# Patient Record
Sex: MALE | Race: WHITE | URBAN - METROPOLITAN AREA
[De-identification: names, ages, dates, MRNs, and addresses within clinical notes are randomized per-mention and may not be internally consistent; named-entity substitution may affect disease eponyms.]

---

## 2024-01-01 ENCOUNTER — NURSING HOME VISIT (OUTPATIENT)
Dept: GERIATRICS | Facility: CLINIC | Age: 79
End: 2024-01-01
Payer: COMMERCIAL

## 2024-01-01 ENCOUNTER — TRANSITIONAL CARE UNIT VISIT (OUTPATIENT)
Dept: GERIATRICS | Facility: CLINIC | Age: 79
End: 2024-01-01
Payer: COMMERCIAL

## 2024-01-01 ENCOUNTER — LAB REQUISITION (OUTPATIENT)
Dept: LAB | Facility: CLINIC | Age: 79
End: 2024-01-01
Payer: COMMERCIAL

## 2024-01-01 ENCOUNTER — DOCUMENTATION ONLY (OUTPATIENT)
Dept: GERIATRICS | Facility: CLINIC | Age: 79
End: 2024-01-01
Payer: COMMERCIAL

## 2024-01-01 ENCOUNTER — TELEPHONE (OUTPATIENT)
Dept: GERIATRICS | Facility: CLINIC | Age: 79
End: 2024-01-01

## 2024-01-01 ENCOUNTER — DOCUMENTATION ONLY (OUTPATIENT)
Dept: OTHER | Facility: CLINIC | Age: 79
End: 2024-01-01
Payer: COMMERCIAL

## 2024-01-01 ENCOUNTER — TELEPHONE (OUTPATIENT)
Dept: GERIATRICS | Facility: CLINIC | Age: 79
End: 2024-01-01
Payer: COMMERCIAL

## 2024-01-01 ENCOUNTER — TRANSFERRED RECORDS (OUTPATIENT)
Dept: HEALTH INFORMATION MANAGEMENT | Facility: CLINIC | Age: 79
End: 2024-01-01
Payer: COMMERCIAL

## 2024-01-01 ENCOUNTER — DOCUMENTATION ONLY (OUTPATIENT)
Dept: GERIATRICS | Facility: CLINIC | Age: 79
End: 2024-01-01

## 2024-01-01 ENCOUNTER — HEALTH MAINTENANCE LETTER (OUTPATIENT)
Age: 79
End: 2024-01-01

## 2024-01-01 ENCOUNTER — DISCHARGE SUMMARY NURSING HOME (OUTPATIENT)
Dept: GERIATRICS | Facility: CLINIC | Age: 79
End: 2024-01-01
Payer: COMMERCIAL

## 2024-01-01 VITALS
HEART RATE: 88 BPM | DIASTOLIC BLOOD PRESSURE: 75 MMHG | OXYGEN SATURATION: 97 % | SYSTOLIC BLOOD PRESSURE: 176 MMHG | WEIGHT: 201 LBS | TEMPERATURE: 98.2 F | BODY MASS INDEX: 28.84 KG/M2 | RESPIRATION RATE: 18 BRPM

## 2024-01-01 VITALS
HEART RATE: 82 BPM | RESPIRATION RATE: 20 BRPM | WEIGHT: 205 LBS | DIASTOLIC BLOOD PRESSURE: 55 MMHG | OXYGEN SATURATION: 95 % | SYSTOLIC BLOOD PRESSURE: 146 MMHG | TEMPERATURE: 98.6 F | BODY MASS INDEX: 29.41 KG/M2

## 2024-01-01 VITALS
SYSTOLIC BLOOD PRESSURE: 167 MMHG | RESPIRATION RATE: 20 BRPM | WEIGHT: 186.3 LBS | TEMPERATURE: 98 F | BODY MASS INDEX: 26.67 KG/M2 | DIASTOLIC BLOOD PRESSURE: 80 MMHG | OXYGEN SATURATION: 95 % | HEIGHT: 70 IN | HEART RATE: 73 BPM

## 2024-01-01 VITALS
SYSTOLIC BLOOD PRESSURE: 176 MMHG | RESPIRATION RATE: 18 BRPM | HEART RATE: 88 BPM | TEMPERATURE: 98.6 F | BODY MASS INDEX: 28.84 KG/M2 | OXYGEN SATURATION: 97 % | DIASTOLIC BLOOD PRESSURE: 68 MMHG | WEIGHT: 201 LBS

## 2024-01-01 VITALS
OXYGEN SATURATION: 93 % | WEIGHT: 185 LBS | SYSTOLIC BLOOD PRESSURE: 167 MMHG | TEMPERATURE: 97.5 F | HEIGHT: 70 IN | BODY MASS INDEX: 26.48 KG/M2 | RESPIRATION RATE: 18 BRPM | HEART RATE: 71 BPM | DIASTOLIC BLOOD PRESSURE: 77 MMHG

## 2024-01-01 VITALS
HEIGHT: 70 IN | HEART RATE: 60 BPM | SYSTOLIC BLOOD PRESSURE: 173 MMHG | DIASTOLIC BLOOD PRESSURE: 70 MMHG | RESPIRATION RATE: 18 BRPM | WEIGHT: 186 LBS | TEMPERATURE: 98 F | OXYGEN SATURATION: 96 % | BODY MASS INDEX: 26.63 KG/M2

## 2024-01-01 VITALS
OXYGEN SATURATION: 98 % | HEART RATE: 78 BPM | BODY MASS INDEX: 28.77 KG/M2 | SYSTOLIC BLOOD PRESSURE: 145 MMHG | DIASTOLIC BLOOD PRESSURE: 82 MMHG | WEIGHT: 201 LBS | HEIGHT: 70 IN | RESPIRATION RATE: 18 BRPM | TEMPERATURE: 98.2 F

## 2024-01-01 VITALS
HEART RATE: 73 BPM | TEMPERATURE: 98.2 F | RESPIRATION RATE: 18 BRPM | HEIGHT: 70 IN | DIASTOLIC BLOOD PRESSURE: 70 MMHG | BODY MASS INDEX: 26.58 KG/M2 | SYSTOLIC BLOOD PRESSURE: 168 MMHG | OXYGEN SATURATION: 93 % | WEIGHT: 185.7 LBS

## 2024-01-01 VITALS
WEIGHT: 186 LBS | TEMPERATURE: 97.5 F | HEART RATE: 72 BPM | DIASTOLIC BLOOD PRESSURE: 75 MMHG | BODY MASS INDEX: 26.63 KG/M2 | OXYGEN SATURATION: 95 % | RESPIRATION RATE: 18 BRPM | HEIGHT: 70 IN | SYSTOLIC BLOOD PRESSURE: 152 MMHG

## 2024-01-01 VITALS
HEIGHT: 70 IN | HEART RATE: 60 BPM | DIASTOLIC BLOOD PRESSURE: 76 MMHG | SYSTOLIC BLOOD PRESSURE: 183 MMHG | RESPIRATION RATE: 18 BRPM | TEMPERATURE: 98 F | BODY MASS INDEX: 26.63 KG/M2 | WEIGHT: 186 LBS | OXYGEN SATURATION: 96 %

## 2024-01-01 VITALS
TEMPERATURE: 98.6 F | WEIGHT: 186 LBS | RESPIRATION RATE: 19 BRPM | HEIGHT: 70 IN | SYSTOLIC BLOOD PRESSURE: 165 MMHG | HEART RATE: 76 BPM | OXYGEN SATURATION: 96 % | DIASTOLIC BLOOD PRESSURE: 69 MMHG | BODY MASS INDEX: 26.63 KG/M2

## 2024-01-01 VITALS
OXYGEN SATURATION: 92 % | TEMPERATURE: 98 F | WEIGHT: 197.2 LBS | HEART RATE: 84 BPM | RESPIRATION RATE: 18 BRPM | DIASTOLIC BLOOD PRESSURE: 78 MMHG | DIASTOLIC BLOOD PRESSURE: 83 MMHG | OXYGEN SATURATION: 93 % | SYSTOLIC BLOOD PRESSURE: 158 MMHG | RESPIRATION RATE: 18 BRPM | TEMPERATURE: 98.4 F | HEART RATE: 86 BPM | SYSTOLIC BLOOD PRESSURE: 162 MMHG | WEIGHT: 195.8 LBS

## 2024-01-01 VITALS
TEMPERATURE: 97.7 F | HEART RATE: 53 BPM | BODY MASS INDEX: 40.41 KG/M2 | RESPIRATION RATE: 18 BRPM | WEIGHT: 281.6 LBS | OXYGEN SATURATION: 92 % | DIASTOLIC BLOOD PRESSURE: 72 MMHG | SYSTOLIC BLOOD PRESSURE: 168 MMHG

## 2024-01-01 VITALS
OXYGEN SATURATION: 95 % | SYSTOLIC BLOOD PRESSURE: 175 MMHG | HEART RATE: 86 BPM | DIASTOLIC BLOOD PRESSURE: 77 MMHG | RESPIRATION RATE: 18 BRPM | WEIGHT: 199.8 LBS | TEMPERATURE: 98 F

## 2024-01-01 VITALS
OXYGEN SATURATION: 70 % | SYSTOLIC BLOOD PRESSURE: 181 MMHG | RESPIRATION RATE: 18 BRPM | DIASTOLIC BLOOD PRESSURE: 74 MMHG | TEMPERATURE: 97.5 F | HEART RATE: 69 BPM

## 2024-01-01 VITALS
HEART RATE: 88 BPM | TEMPERATURE: 98.2 F | DIASTOLIC BLOOD PRESSURE: 74 MMHG | BODY MASS INDEX: 28.84 KG/M2 | SYSTOLIC BLOOD PRESSURE: 158 MMHG | OXYGEN SATURATION: 97 % | WEIGHT: 201 LBS | RESPIRATION RATE: 18 BRPM

## 2024-01-01 DIAGNOSIS — I73.9 PAD (PERIPHERAL ARTERY DISEASE) (H): ICD-10-CM

## 2024-01-01 DIAGNOSIS — E11.22 TYPE 2 DM WITH CKD STAGE 4 AND HYPERTENSION (H): ICD-10-CM

## 2024-01-01 DIAGNOSIS — D64.9 ANEMIA, UNSPECIFIED TYPE: ICD-10-CM

## 2024-01-01 DIAGNOSIS — I12.9 TYPE 2 DM WITH CKD STAGE 4 AND HYPERTENSION (H): ICD-10-CM

## 2024-01-01 DIAGNOSIS — Z79.01 LONG TERM CURRENT USE OF ANTICOAGULANT THERAPY: ICD-10-CM

## 2024-01-01 DIAGNOSIS — I48.20 CHRONIC ATRIAL FIBRILLATION (H): ICD-10-CM

## 2024-01-01 DIAGNOSIS — N17.9 ACUTE KIDNEY FAILURE, UNSPECIFIED (H): ICD-10-CM

## 2024-01-01 DIAGNOSIS — R79.1 ABNORMAL COAGULATION PROFILE: ICD-10-CM

## 2024-01-01 DIAGNOSIS — E11.42 DIABETIC PERIPHERAL NEUROPATHY ASSOCIATED WITH TYPE 2 DIABETES MELLITUS (H): ICD-10-CM

## 2024-01-01 DIAGNOSIS — R06.03 RESPIRATORY DISTRESS: Primary | ICD-10-CM

## 2024-01-01 DIAGNOSIS — D64.9 ANEMIA, UNSPECIFIED: ICD-10-CM

## 2024-01-01 DIAGNOSIS — N18.4 TYPE 2 DM WITH CKD STAGE 4 AND HYPERTENSION (H): ICD-10-CM

## 2024-01-01 DIAGNOSIS — E55.9 VITAMIN D DEFICIENCY, UNSPECIFIED: ICD-10-CM

## 2024-01-01 DIAGNOSIS — E11.22 TYPE 2 DIABETES MELLITUS WITH STAGE 3A CHRONIC KIDNEY DISEASE, WITHOUT LONG-TERM CURRENT USE OF INSULIN (H): ICD-10-CM

## 2024-01-01 DIAGNOSIS — S70.11XS HEMATOMA OF RIGHT THIGH, SEQUELA: Primary | ICD-10-CM

## 2024-01-01 DIAGNOSIS — N18.4 CKD (CHRONIC KIDNEY DISEASE) STAGE 4, GFR 15-29 ML/MIN (H): Primary | ICD-10-CM

## 2024-01-01 DIAGNOSIS — F03.B3 MODERATE DEMENTIA WITH MOOD DISTURBANCE, UNSPECIFIED DEMENTIA TYPE (H): ICD-10-CM

## 2024-01-01 DIAGNOSIS — N18.31 TYPE 2 DIABETES MELLITUS WITH STAGE 3A CHRONIC KIDNEY DISEASE, WITHOUT LONG-TERM CURRENT USE OF INSULIN (H): ICD-10-CM

## 2024-01-01 DIAGNOSIS — R53.81 PHYSICAL DECONDITIONING: ICD-10-CM

## 2024-01-01 DIAGNOSIS — I13.10 HYPERTENSIVE HEART AND RENAL DISEASE WITH RENAL FAILURE, STAGE 1 THROUGH STAGE 4 OR UNSPECIFIED CHRONIC KIDNEY DISEASE, WITHOUT HEART FAILURE: ICD-10-CM

## 2024-01-01 DIAGNOSIS — I48.20 CHRONIC ATRIAL FIBRILLATION, UNSPECIFIED (H): ICD-10-CM

## 2024-01-01 DIAGNOSIS — S32.599S CLOSED FRACTURE OF PUBIC RAMUS, UNSPECIFIED LATERALITY, SEQUELA: ICD-10-CM

## 2024-01-01 DIAGNOSIS — N18.4 ACUTE RENAL FAILURE SUPERIMPOSED ON STAGE 4 CHRONIC KIDNEY DISEASE, UNSPECIFIED ACUTE RENAL FAILURE TYPE (H): Primary | ICD-10-CM

## 2024-01-01 DIAGNOSIS — F33.1 MODERATE EPISODE OF RECURRENT MAJOR DEPRESSIVE DISORDER (H): ICD-10-CM

## 2024-01-01 DIAGNOSIS — E11.9 TYPE 2 DIABETES MELLITUS WITHOUT COMPLICATIONS (H): ICD-10-CM

## 2024-01-01 DIAGNOSIS — N39.0 RECURRENT URINARY TRACT INFECTION: ICD-10-CM

## 2024-01-01 DIAGNOSIS — N39.0 RECURRENT UTI: Primary | ICD-10-CM

## 2024-01-01 DIAGNOSIS — D62 ANEMIA DUE TO BLOOD LOSS, ACUTE: ICD-10-CM

## 2024-01-01 DIAGNOSIS — Z11.1 ENCOUNTER FOR SCREENING FOR RESPIRATORY TUBERCULOSIS: ICD-10-CM

## 2024-01-01 DIAGNOSIS — N39.0 URINARY TRACT INFECTION, SITE NOT SPECIFIED: ICD-10-CM

## 2024-01-01 DIAGNOSIS — I10 ESSENTIAL (PRIMARY) HYPERTENSION: ICD-10-CM

## 2024-01-01 DIAGNOSIS — R30.0 DYSURIA: ICD-10-CM

## 2024-01-01 DIAGNOSIS — N18.9 CHRONIC KIDNEY DISEASE, UNSPECIFIED: ICD-10-CM

## 2024-01-01 DIAGNOSIS — I73.9 PAD (PERIPHERAL ARTERY DISEASE) (H): Primary | ICD-10-CM

## 2024-01-01 DIAGNOSIS — Z51.81 ENCOUNTER FOR THERAPEUTIC DRUG MONITORING: ICD-10-CM

## 2024-01-01 DIAGNOSIS — I73.9 PERIPHERAL VASCULAR DISEASE, UNSPECIFIED (H): ICD-10-CM

## 2024-01-01 DIAGNOSIS — I48.20 CHRONIC ATRIAL FIBRILLATION (H): Primary | ICD-10-CM

## 2024-01-01 DIAGNOSIS — J18.9 PNEUMONIA OF BOTH LUNGS DUE TO INFECTIOUS ORGANISM, UNSPECIFIED PART OF LUNG: Primary | ICD-10-CM

## 2024-01-01 DIAGNOSIS — N17.9 ACUTE RENAL FAILURE SUPERIMPOSED ON STAGE 4 CHRONIC KIDNEY DISEASE, UNSPECIFIED ACUTE RENAL FAILURE TYPE (H): Primary | ICD-10-CM

## 2024-01-01 DIAGNOSIS — R40.0 SOMNOLENCE: ICD-10-CM

## 2024-01-01 DIAGNOSIS — I48.91 UNSPECIFIED ATRIAL FIBRILLATION (H): ICD-10-CM

## 2024-01-01 DIAGNOSIS — N39.0 RECURRENT UTI: ICD-10-CM

## 2024-01-01 DIAGNOSIS — N18.4 CKD (CHRONIC KIDNEY DISEASE) STAGE 4, GFR 15-29 ML/MIN (H): ICD-10-CM

## 2024-01-01 DIAGNOSIS — F03.918 DEMENTIA WITH BEHAVIORAL DISTURBANCE (H): ICD-10-CM

## 2024-01-01 LAB
ALBUMIN UR-MCNC: 200 MG/DL
ALBUMIN UR-MCNC: 300 MG/DL
ALBUMIN UR-MCNC: 300 MG/DL
ANION GAP SERPL CALCULATED.3IONS-SCNC: 6 MMOL/L (ref 7–15)
ANION GAP SERPL CALCULATED.3IONS-SCNC: 7 MMOL/L (ref 7–15)
ANION GAP SERPL CALCULATED.3IONS-SCNC: 8 MMOL/L (ref 7–15)
ANION GAP SERPL CALCULATED.3IONS-SCNC: 9 MMOL/L (ref 7–15)
APPEARANCE UR: ABNORMAL
APPEARANCE UR: CLEAR
APPEARANCE UR: CLEAR
BACTERIA #/AREA URNS HPF: ABNORMAL /HPF
BACTERIA #/AREA URNS HPF: ABNORMAL /HPF
BACTERIA UR CULT: NO GROWTH
BACTERIA UR CULT: NO GROWTH
BASOPHILS # BLD AUTO: 0 10E3/UL (ref 0–0.2)
BASOPHILS NFR BLD AUTO: 1 %
BILIRUB UR QL STRIP: NEGATIVE
BUN SERPL-MCNC: 38.3 MG/DL (ref 8–23)
BUN SERPL-MCNC: 40.3 MG/DL (ref 8–23)
BUN SERPL-MCNC: 41.6 MG/DL (ref 8–23)
BUN SERPL-MCNC: 46 MG/DL (ref 8–23)
BUN SERPL-MCNC: 47 MG/DL (ref 8–23)
BUN SERPL-MCNC: 50.3 MG/DL (ref 8–23)
CALCIUM SERPL-MCNC: 7.8 MG/DL (ref 8.8–10.4)
CALCIUM SERPL-MCNC: 8.1 MG/DL (ref 8.8–10.2)
CALCIUM SERPL-MCNC: 8.1 MG/DL (ref 8.8–10.4)
CALCIUM SERPL-MCNC: 8.2 MG/DL (ref 8.8–10.4)
CALCIUM SERPL-MCNC: 8.3 MG/DL (ref 8.8–10.2)
CALCIUM SERPL-MCNC: 8.3 MG/DL (ref 8.8–10.2)
CHLORIDE SERPL-SCNC: 110 MMOL/L (ref 98–107)
CHLORIDE SERPL-SCNC: 112 MMOL/L (ref 98–107)
CHLORIDE SERPL-SCNC: 112 MMOL/L (ref 98–107)
CHLORIDE SERPL-SCNC: 113 MMOL/L (ref 98–107)
CHLORIDE SERPL-SCNC: 116 MMOL/L (ref 98–107)
CHLORIDE SERPL-SCNC: 117 MMOL/L (ref 98–107)
COLOR UR AUTO: ABNORMAL
CREAT SERPL-MCNC: 2.15 MG/DL (ref 0.67–1.17)
CREAT SERPL-MCNC: 2.15 MG/DL (ref 0.67–1.17)
CREAT SERPL-MCNC: 2.17 MG/DL (ref 0.67–1.17)
CREAT SERPL-MCNC: 2.26 MG/DL (ref 0.67–1.17)
CREAT SERPL-MCNC: 2.32 MG/DL (ref 0.67–1.17)
CREAT SERPL-MCNC: 2.39 MG/DL (ref 0.67–1.17)
DEPRECATED HCO3 PLAS-SCNC: 22 MMOL/L (ref 22–29)
DEPRECATED HCO3 PLAS-SCNC: 25 MMOL/L (ref 22–29)
DEPRECATED HCO3 PLAS-SCNC: 25 MMOL/L (ref 22–29)
EGFRCR SERPLBLD CKD-EPI 2021: 27 ML/MIN/1.73M2
EGFRCR SERPLBLD CKD-EPI 2021: 28 ML/MIN/1.73M2
EGFRCR SERPLBLD CKD-EPI 2021: 29 ML/MIN/1.73M2
EGFRCR SERPLBLD CKD-EPI 2021: 31 ML/MIN/1.73M2
EOSINOPHIL # BLD AUTO: 0.2 10E3/UL (ref 0–0.7)
EOSINOPHIL NFR BLD AUTO: 2 %
ERYTHROCYTE [DISTWIDTH] IN BLOOD BY AUTOMATED COUNT: 14.3 % (ref 10–15)
ERYTHROCYTE [DISTWIDTH] IN BLOOD BY AUTOMATED COUNT: 14.4 % (ref 10–15)
ERYTHROCYTE [DISTWIDTH] IN BLOOD BY AUTOMATED COUNT: 14.6 % (ref 10–15)
GAMMA INTERFERON BACKGROUND BLD IA-ACNC: 0.03 IU/ML
GAMMA INTERFERON BACKGROUND BLD IA-ACNC: 0.03 IU/ML
GLUCOSE SERPL-MCNC: 103 MG/DL (ref 70–99)
GLUCOSE SERPL-MCNC: 106 MG/DL (ref 70–99)
GLUCOSE SERPL-MCNC: 108 MG/DL (ref 70–99)
GLUCOSE SERPL-MCNC: 117 MG/DL (ref 70–99)
GLUCOSE SERPL-MCNC: 117 MG/DL (ref 70–99)
GLUCOSE SERPL-MCNC: 123 MG/DL (ref 70–99)
GLUCOSE UR STRIP-MCNC: 100 MG/DL
GLUCOSE UR STRIP-MCNC: 200 MG/DL
GLUCOSE UR STRIP-MCNC: >=1000 MG/DL
HBA1C MFR BLD: 6.5 %
HCO3 SERPL-SCNC: 23 MMOL/L (ref 22–29)
HCO3 SERPL-SCNC: 23 MMOL/L (ref 22–29)
HCO3 SERPL-SCNC: 26 MMOL/L (ref 22–29)
HCT VFR BLD AUTO: 26.1 % (ref 40–53)
HCT VFR BLD AUTO: 26.2 % (ref 40–53)
HCT VFR BLD AUTO: 30.2 % (ref 40–53)
HGB BLD-MCNC: 8 G/DL (ref 13.3–17.7)
HGB BLD-MCNC: 8.2 G/DL (ref 13.3–17.7)
HGB BLD-MCNC: 8.9 G/DL (ref 13.3–17.7)
HGB BLD-MCNC: 9.7 G/DL (ref 13.3–17.7)
HGB UR QL STRIP: ABNORMAL
IMM GRANULOCYTES # BLD: 0 10E3/UL
IMM GRANULOCYTES NFR BLD: 0 %
INR PPP: 1.06 (ref 0.85–1.15)
INR PPP: 1.72 (ref 0.85–1.15)
INR PPP: 2.24 (ref 0.85–1.15)
INR PPP: 2.78 (ref 0.85–1.15)
INR PPP: 4.54 (ref 0.85–1.15)
IRON BINDING CAPACITY (ROCHE): 163 UG/DL (ref 240–430)
IRON SATN MFR SERPL: 26 % (ref 15–46)
IRON SERPL-MCNC: 43 UG/DL (ref 61–157)
KETONES UR STRIP-MCNC: NEGATIVE MG/DL
LEUKOCYTE ESTERASE UR QL STRIP: ABNORMAL
LEUKOCYTE ESTERASE UR QL STRIP: ABNORMAL
LEUKOCYTE ESTERASE UR QL STRIP: NEGATIVE
LYMPHOCYTES # BLD AUTO: 2.1 10E3/UL (ref 0.8–5.3)
LYMPHOCYTES NFR BLD AUTO: 26 %
M TB IFN-G BLD-IMP: NEGATIVE
M TB IFN-G BLD-IMP: NEGATIVE
M TB IFN-G CD4+ BCKGRND COR BLD-ACNC: 1.63 IU/ML
M TB IFN-G CD4+ BCKGRND COR BLD-ACNC: 3.38 IU/ML
MCH RBC QN AUTO: 28 PG (ref 26.5–33)
MCH RBC QN AUTO: 28.8 PG (ref 26.5–33)
MCH RBC QN AUTO: 29.1 PG (ref 26.5–33)
MCHC RBC AUTO-ENTMCNC: 30.5 G/DL (ref 31.5–36.5)
MCHC RBC AUTO-ENTMCNC: 31.4 G/DL (ref 31.5–36.5)
MCHC RBC AUTO-ENTMCNC: 32.1 G/DL (ref 31.5–36.5)
MCV RBC AUTO: 91 FL (ref 78–100)
MCV RBC AUTO: 92 FL (ref 78–100)
MCV RBC AUTO: 92 FL (ref 78–100)
MITOGEN IGNF BCKGRD COR BLD-ACNC: 0 IU/ML
MONOCYTES # BLD AUTO: 0.5 10E3/UL (ref 0–1.3)
MONOCYTES NFR BLD AUTO: 6 %
MUCOUS THREADS #/AREA URNS LPF: PRESENT /LPF
NEUTROPHILS # BLD AUTO: 5.3 10E3/UL (ref 1.6–8.3)
NEUTROPHILS NFR BLD AUTO: 65 %
NITRATE UR QL: NEGATIVE
NRBC # BLD AUTO: 0 10E3/UL
NRBC BLD AUTO-RTO: 0 /100
PH UR STRIP: 5.5 [PH] (ref 5–7)
PH UR STRIP: 6 [PH] (ref 5–7)
PH UR STRIP: 7 [PH] (ref 5–7)
PLATELET # BLD AUTO: 168 10E3/UL (ref 150–450)
PLATELET # BLD AUTO: 183 10E3/UL (ref 150–450)
PLATELET # BLD AUTO: 205 10E3/UL (ref 150–450)
POTASSIUM SERPL-SCNC: 4 MMOL/L (ref 3.4–5.3)
POTASSIUM SERPL-SCNC: 4 MMOL/L (ref 3.4–5.3)
POTASSIUM SERPL-SCNC: 4.1 MMOL/L (ref 3.4–5.3)
POTASSIUM SERPL-SCNC: 4.2 MMOL/L (ref 3.4–5.3)
POTASSIUM SERPL-SCNC: 4.3 MMOL/L (ref 3.4–5.3)
POTASSIUM SERPL-SCNC: 4.4 MMOL/L (ref 3.4–5.3)
QUANTIFERON MITOGEN: 1.66 IU/ML
QUANTIFERON MITOGEN: 3.41 IU/ML
QUANTIFERON NIL TUBE: 0.03 IU/ML
QUANTIFERON NIL TUBE: 0.03 IU/ML
QUANTIFERON TB1 TUBE: 0.03 IU/ML
QUANTIFERON TB1 TUBE: 0.03 IU/ML
QUANTIFERON TB2 TUBE: 0.03
QUANTIFERON TB2 TUBE: 0.03
RBC # BLD AUTO: 2.85 10E6/UL (ref 4.4–5.9)
RBC # BLD AUTO: 2.86 10E6/UL (ref 4.4–5.9)
RBC # BLD AUTO: 3.33 10E6/UL (ref 4.4–5.9)
RBC URINE: 3 /HPF
RBC URINE: 3 /HPF
RBC URINE: 4 /HPF
SODIUM SERPL-SCNC: 142 MMOL/L (ref 135–145)
SODIUM SERPL-SCNC: 143 MMOL/L (ref 135–145)
SODIUM SERPL-SCNC: 144 MMOL/L (ref 135–145)
SODIUM SERPL-SCNC: 145 MMOL/L (ref 135–145)
SODIUM SERPL-SCNC: 147 MMOL/L (ref 135–145)
SODIUM SERPL-SCNC: 147 MMOL/L (ref 135–145)
SP GR UR STRIP: 1.01 (ref 1–1.03)
SP GR UR STRIP: 1.01 (ref 1–1.03)
SP GR UR STRIP: 1.02 (ref 1–1.03)
SQUAMOUS EPITHELIAL: <1 /HPF
UROBILINOGEN UR STRIP-MCNC: NORMAL MG/DL
WBC # BLD AUTO: 6.9 10E3/UL (ref 4–11)
WBC # BLD AUTO: 8.1 10E3/UL (ref 4–11)
WBC # BLD AUTO: 9.1 10E3/UL (ref 4–11)
WBC CLUMPS #/AREA URNS HPF: PRESENT /HPF
WBC CLUMPS #/AREA URNS HPF: PRESENT /HPF
WBC URINE: 54 /HPF
WBC URINE: 91 /HPF
WBC URINE: <1 /HPF

## 2024-01-01 PROCEDURE — P9604 ONE-WAY ALLOW PRORATED TRIP: HCPCS | Mod: ORL | Performed by: NURSE PRACTITIONER

## 2024-01-01 PROCEDURE — 80048 BASIC METABOLIC PNL TOTAL CA: CPT | Mod: ORL | Performed by: NURSE PRACTITIONER

## 2024-01-01 PROCEDURE — P9604 ONE-WAY ALLOW PRORATED TRIP: HCPCS | Performed by: INTERNAL MEDICINE

## 2024-01-01 PROCEDURE — 99316 NF DSCHRG MGMT 30 MIN+: CPT | Performed by: NURSE PRACTITIONER

## 2024-01-01 PROCEDURE — 99308 SBSQ NF CARE LOW MDM 20: CPT | Performed by: NURSE PRACTITIONER

## 2024-01-01 PROCEDURE — 87086 URINE CULTURE/COLONY COUNT: CPT | Mod: ORL | Performed by: INTERNAL MEDICINE

## 2024-01-01 PROCEDURE — 85027 COMPLETE CBC AUTOMATED: CPT | Mod: ORL | Performed by: NURSE PRACTITIONER

## 2024-01-01 PROCEDURE — 99310 SBSQ NF CARE HIGH MDM 45: CPT | Performed by: NURSE PRACTITIONER

## 2024-01-01 PROCEDURE — 86481 TB AG RESPONSE T-CELL SUSP: CPT | Performed by: INTERNAL MEDICINE

## 2024-01-01 PROCEDURE — 36415 COLL VENOUS BLD VENIPUNCTURE: CPT | Mod: ORL | Performed by: NURSE PRACTITIONER

## 2024-01-01 PROCEDURE — 36415 COLL VENOUS BLD VENIPUNCTURE: CPT | Performed by: INTERNAL MEDICINE

## 2024-01-01 PROCEDURE — 81001 URINALYSIS AUTO W/SCOPE: CPT | Mod: ORL | Performed by: NURSE PRACTITIONER

## 2024-01-01 PROCEDURE — 81001 URINALYSIS AUTO W/SCOPE: CPT | Mod: ORL | Performed by: INTERNAL MEDICINE

## 2024-01-01 PROCEDURE — 85610 PROTHROMBIN TIME: CPT | Mod: ORL | Performed by: INTERNAL MEDICINE

## 2024-01-01 PROCEDURE — 99305 1ST NF CARE MODERATE MDM 35: CPT | Performed by: INTERNAL MEDICINE

## 2024-01-01 PROCEDURE — 85610 PROTHROMBIN TIME: CPT | Performed by: INTERNAL MEDICINE

## 2024-01-01 PROCEDURE — 83036 HEMOGLOBIN GLYCOSYLATED A1C: CPT | Mod: ORL | Performed by: NURSE PRACTITIONER

## 2024-01-01 PROCEDURE — 85025 COMPLETE CBC W/AUTO DIFF WBC: CPT | Mod: ORL | Performed by: NURSE PRACTITIONER

## 2024-01-01 PROCEDURE — 99309 SBSQ NF CARE MODERATE MDM 30: CPT | Performed by: NURSE PRACTITIONER

## 2024-01-01 PROCEDURE — 99309 SBSQ NF CARE MODERATE MDM 30: CPT | Performed by: INTERNAL MEDICINE

## 2024-01-01 PROCEDURE — P9604 ONE-WAY ALLOW PRORATED TRIP: HCPCS | Mod: ORL | Performed by: INTERNAL MEDICINE

## 2024-01-01 PROCEDURE — 83550 IRON BINDING TEST: CPT | Mod: ORL | Performed by: NURSE PRACTITIONER

## 2024-01-01 PROCEDURE — 85610 PROTHROMBIN TIME: CPT | Mod: ORL | Performed by: NURSE PRACTITIONER

## 2024-01-01 PROCEDURE — 87086 URINE CULTURE/COLONY COUNT: CPT | Mod: ORL | Performed by: NURSE PRACTITIONER

## 2024-01-01 PROCEDURE — 85018 HEMOGLOBIN: CPT | Mod: ORL | Performed by: NURSE PRACTITIONER

## 2024-01-01 PROCEDURE — 36415 COLL VENOUS BLD VENIPUNCTURE: CPT | Mod: ORL | Performed by: INTERNAL MEDICINE

## 2024-01-01 RX ORDER — HYDRALAZINE HYDROCHLORIDE 10 MG/1
25 TABLET, FILM COATED ORAL 3 TIMES DAILY
COMMUNITY
Start: 2024-01-01

## 2024-01-01 RX ORDER — GABAPENTIN 300 MG/1
300 TABLET, FILM COATED ORAL
COMMUNITY
End: 2024-01-01

## 2024-01-01 RX ORDER — ACETAMINOPHEN 325 MG/1
650 TABLET ORAL 3 TIMES DAILY
COMMUNITY

## 2024-01-01 RX ORDER — QUETIAPINE FUMARATE 25 MG/1
12.5 TABLET, FILM COATED ORAL DAILY
COMMUNITY

## 2024-01-01 RX ORDER — GABAPENTIN 300 MG/1
300 CAPSULE ORAL AT BEDTIME
COMMUNITY
Start: 2024-01-01

## 2024-01-01 RX ORDER — SODIUM BICARBONATE 650 MG/1
650 TABLET ORAL 2 TIMES DAILY
COMMUNITY

## 2024-01-01 RX ORDER — POLYETHYLENE GLYCOL 3350 17 G/17G
17 POWDER, FOR SOLUTION ORAL DAILY
COMMUNITY

## 2024-01-01 RX ORDER — ATORVASTATIN CALCIUM 40 MG/1
40 TABLET, FILM COATED ORAL DAILY
COMMUNITY

## 2024-01-01 RX ORDER — QUETIAPINE FUMARATE 25 MG/1
6.25 TABLET, FILM COATED ORAL DAILY
COMMUNITY

## 2024-01-01 RX ORDER — AMLODIPINE BESYLATE 10 MG/1
10 TABLET ORAL DAILY
COMMUNITY

## 2024-01-01 RX ORDER — CITALOPRAM HYDROBROMIDE 10 MG/1
10 TABLET ORAL DAILY
COMMUNITY
Start: 2024-01-01

## 2024-01-01 RX ORDER — TAMSULOSIN HYDROCHLORIDE 0.4 MG/1
0.4 CAPSULE ORAL DAILY
COMMUNITY

## 2024-01-01 RX ORDER — METOPROLOL TARTRATE 25 MG/1
100 TABLET, FILM COATED ORAL 2 TIMES DAILY
COMMUNITY

## 2024-01-01 RX ORDER — SODIUM BICARBONATE 650 MG/1
650 TABLET ORAL 2 TIMES DAILY
COMMUNITY
End: 2024-01-01

## 2024-01-01 RX ORDER — AMITRIPTYLINE HYDROCHLORIDE 100 MG/1
100 TABLET ORAL AT BEDTIME
COMMUNITY
End: 2024-01-01

## 2024-01-01 RX ORDER — WARFARIN SODIUM 5 MG/1
TABLET ORAL DAILY
COMMUNITY

## 2024-01-01 RX ORDER — ACETAMINOPHEN 500 MG
1000 TABLET ORAL 3 TIMES DAILY
COMMUNITY
End: 2024-01-01

## 2024-01-01 RX ORDER — SENNOSIDES 8.6 MG
1 TABLET ORAL 2 TIMES DAILY
COMMUNITY

## 2024-01-01 RX ORDER — LIDOCAINE 4 G/G
1 PATCH TOPICAL EVERY 24 HOURS
COMMUNITY

## 2024-01-01 RX ORDER — LISINOPRIL 20 MG/1
20 TABLET ORAL DAILY
COMMUNITY
End: 2024-01-01

## 2024-01-01 RX ORDER — LIDOCAINE 4 G/G
1 PATCH TOPICAL EVERY 24 HOURS
COMMUNITY
End: 2024-01-01

## 2024-01-01 RX ORDER — TAMSULOSIN HYDROCHLORIDE 0.4 MG/1
0.8 CAPSULE ORAL DAILY
COMMUNITY
End: 2024-01-01

## 2024-01-16 PROBLEM — E11.42 DIABETIC PERIPHERAL NEUROPATHY ASSOCIATED WITH TYPE 2 DIABETES MELLITUS (H): Status: ACTIVE | Noted: 2021-07-05

## 2024-01-16 PROBLEM — N25.81 SECONDARY HYPERPARATHYROIDISM OF RENAL ORIGIN (H): Status: ACTIVE | Noted: 2023-05-22

## 2024-01-16 PROBLEM — E11.22 TYPE 2 DIABETES MELLITUS WITH STAGE 3A CHRONIC KIDNEY DISEASE, WITHOUT LONG-TERM CURRENT USE OF INSULIN (H): Status: ACTIVE | Noted: 2020-08-15

## 2024-01-16 PROBLEM — R80.9 NEPHROTIC RANGE PROTEINURIA: Status: ACTIVE | Noted: 2020-09-04

## 2024-01-16 PROBLEM — M72.0 DUPUYTREN'S CONTRACTURE OF RIGHT HAND: Status: ACTIVE | Noted: 2022-10-04

## 2024-01-16 PROBLEM — S32.512A: Status: ACTIVE | Noted: 2024-01-01

## 2024-01-16 PROBLEM — I73.9 PAD (PERIPHERAL ARTERY DISEASE) (H): Status: ACTIVE | Noted: 2020-09-10

## 2024-01-16 PROBLEM — N28.9 RENAL LESION: Status: ACTIVE | Noted: 2020-10-02

## 2024-01-16 PROBLEM — G31.84 MILD COGNITIVE IMPAIRMENT: Status: ACTIVE | Noted: 2022-08-12

## 2024-01-16 PROBLEM — N18.31 STAGE 3A CHRONIC KIDNEY DISEASE (H): Status: ACTIVE | Noted: 2022-08-29

## 2024-01-16 PROBLEM — T82.599A FAILING VASCULAR BYPASS GRAFT: Status: ACTIVE | Noted: 2020-09-10

## 2024-01-16 PROBLEM — R32 URINARY INCONTINENCE: Status: ACTIVE | Noted: 2022-10-04

## 2024-01-16 PROBLEM — N18.31 TYPE 2 DIABETES MELLITUS WITH STAGE 3A CHRONIC KIDNEY DISEASE, WITHOUT LONG-TERM CURRENT USE OF INSULIN (H): Status: ACTIVE | Noted: 2020-08-15

## 2024-01-16 PROBLEM — S70.11XA HEMATOMA OF RIGHT THIGH: Status: ACTIVE | Noted: 2024-01-01

## 2024-01-16 PROBLEM — Z86.59 HISTORY OF DEPRESSION: Status: ACTIVE | Noted: 2020-10-02

## 2024-01-16 PROBLEM — Z79.01 LONG TERM CURRENT USE OF ANTICOAGULANT THERAPY: Status: ACTIVE | Noted: 2020-10-02

## 2024-01-16 PROBLEM — H25.13 AGE-RELATED NUCLEAR CATARACT OF BOTH EYES: Status: ACTIVE | Noted: 2023-01-01

## 2024-01-16 PROBLEM — R29.6 FREQUENT FALLS: Status: ACTIVE | Noted: 2024-01-01

## 2024-01-16 PROBLEM — I70.209 ATHEROSCLEROSIS OF NATIVE ARTERY OF EXTREMITY (H): Status: ACTIVE | Noted: 2020-09-10

## 2024-01-16 PROBLEM — I10 ESSENTIAL HYPERTENSION: Status: ACTIVE | Noted: 2020-09-21

## 2024-01-16 NOTE — TELEPHONE ENCOUNTER
[facility] Geriatrics Triage Nurse INR     Provider: RAY Garrison CNP   Facility: [facility]   Facility Type:  TCU    Caller: Елена  Call Back Number: [phone]  Reason for call: INR  Diagnosis/Goal: PVD/PAD    Todays INR: 1.1(fingerstick)/1.06(lab)  Last INR 1/11 1.80(hospital)---Warfarin held and Vitamin K given due to hematoma)    Heparin/Lovenox:  No  Currently on ABX?: No  Other interacting medication:  None  Missed or refused doses: No    Verbal Order/Direction given by Provider: Warfarin 7.5mg on 1/16 and 5mg on 1/17.  Next INR(fingerstick) on 1/18/24.      Provider Giving Order:  RAY Garrison CNP     Verbal Order given to: Maggy Moon RN

## 2024-01-16 NOTE — LETTER
1/16/2024        RE: Cory Berg  [address] [address]        [facility] GERIATRICS    PRIMARY CARE PROVIDER AND CLINIC:  No primary care provider on file., No primary physician on file  Chief Complaint   Patient presents with     Hospital F/U      [place] Medical Record Number:  [id number]  Place of Service where encounter took place:  [facility] ([facility]) [[id number]]    HPI:   Cory Berg is a 78 y.o. male with PMH of type 2 DM, HTN, peripheral arterial disease s/p femoral bypass graft on warfarin, CKD stage IIIA, secondary hyperparathyroidism (renal origin), urinary incontinence, depression, and mild cognitive impairment, presented to the ED following a fall, found to have a R thigh hematoma. Hemoglobin 6.8 and he received 1 unit of packed red blood cells, also received vitamin K to reverse his INR.  Hemoglobin 8.5 by discharge.  Coumadin held throughout hospital stay and restarted at discharge. Hospital stay at [facility] 1/10/24-1/15/24.  He was admitted to [facility] for further nursing cares and rehab.    London was visited today while in his room initially eating lunch.  He states that his daughter was just visiting as well as his dog Kimberly who found one of his medications on the floor.  He believes that it was one of his heart medications and did not know that it had fallen.  He currently lives at home with his wife and he is eager to return back to home.  He does not feel that he needs to be here.  He feels very tired and would like to take a nap.  He is sleeping well at night.  Has ongoing tenderness to the right thigh and hip area but does not endorse a bruise there.  Does not have pain or discomfort anywhere else.  He has not felt short of breath, chest pain or cough.  Urinating without discomfort and having regular bowel movements.    CODE STATUS/ADVANCE DIRECTIVES DISCUSSION:  No Order  CPR/Full code   ALLERGIES: No Known Allergies   PAST MEDICAL HISTORY:  No past medical history on file.   PAST SURGICAL HISTORY:   has a past surgical history that includes IR Lower Extremity Angiogram Left (9/10/2020).  FAMILY HISTORY: family history is not on file.  SOCIAL HISTORY:     Patient's living condition: lives with spouse    Post Discharge Medication Reconciliation Status:   MED REC REQUIRED  Post Medication Reconciliation Status: discharge medications reconciled, continue medications without change       Current Outpatient Medications   Medication Sig     acetaminophen (TYLENOL) 500 MG tablet Take 1,000 mg by mouth 3 times daily     amitriptyline (ELAVIL) 100 MG tablet Take 100 mg by mouth at bedtime     amLODIPine (NORVASC) 10 MG tablet Take 10 mg by mouth daily     atorvastatin (LIPITOR) 40 MG tablet Take 40 mg by mouth daily     empagliflozin (JARDIANCE) 10 MG TABS tablet Take 10 mg by mouth daily     gabapentin (GRALISE) 300 MG 24 hr tablet Take 300 mg by mouth daily (with dinner)     lisinopril (ZESTRIL) 20 MG tablet Take 20 mg by mouth daily     metFORMIN (GLUCOPHAGE) 1000 MG tablet Take 2,000 mg by mouth daily (with dinner)     metoprolol tartrate (LOPRESSOR) 25 MG tablet Take 25 mg by mouth 2 times daily     sodium bicarbonate 650 MG tablet Take 650 mg by mouth 2 times daily     tamsulosin (FLOMAX) 0.4 MG capsule Take 0.8 mg by mouth daily     WARFARIN SODIUM PO 1/16/24 INR(fingerstick) 1.1  Take Warfarin 7.5mg on 1/16 and 5mg on 1/17.  Next INR 1/18/24.     No current facility-administered medications for this visit.       ROS:  10 point ROS of systems including Constitutional, Eyes, Respiratory, Cardiovascular, Gastroenterology, Genitourinary, Integumentary, Musculoskeletal, Psychiatric were all negative except for pertinent positives noted in my HPI.    Vitals:  BP (!) 158/78   Pulse 86   Temp 98.4  F (36.9  C)   Resp 18   Wt 88.8 kg (195 lb 12.8 oz)   SpO2 93%   Exam:  GENERAL APPEARANCE:  Alert, in no distress, pleasant, cooperative  EYES: no discharge or  mattering on lids or lashes noted  ENT:  moist mucous membranes, hearing acuity intact  NECK: supple, symmetrical  RESP: no respiratory distress, Lung sounds clear, patient is on room air  CV:  rate and rhythm regular, no murmur. Edema 1+ in RLE moreso in the thigh area,   VASCULAR: warm extremities without open areas.  ABDOMEN: normal bowel sounds, soft, nontender.  M/S:   Gait and station ambulates with walker, no tenderness or swelling of the joints; able to move all extremities   SKIN:  Inspection and palpation of skin and subcutaneous tissue: skin warm, dry and intact without rashes  NEURO: no facial asymmetry, no speech deficits and able to follow directions, moves all extremities symmetrically  PSYCH:  insight and judgement intact, memory mild impairment?, affect and mood normal      Lab/Diagnostic data:  Recent labs in [facility] reviewed by me today.     ASSESSMENT/PLAN:  Hematoma of right thigh  Possible osteoporotic nondisplaced left pubic ramus fracture  Acute blood loss anemia, in setting of chronic anemia  Hemoglobin 6.8 which improved to 8.5 after 1 unit of packed red blood cells. Hemoglobin remained stable for remainder of hospitalization.  Orthopedics recommended conservative management for possible left pubic ramus fracture.  Denies pain today.  -- Will obtain another hemoglobin next week  -- Tylenol for discomfort  -- Will get a vitamin D level next week as well    Depression  Mild cognitive impairment  Falls frequently  Has had multiple falls at home, is unhappy about being at the TCU and questionable if he is participating with therapies. Currently lives at home with his spouse.   --may need more assistance upon discharge.   --OT to administer further cog tests.   --ongoing PT.Ot for strengthening.   -- Amitriptyline at bedtime    Stage 3a chronic kidney disease (HRC)  Creatinine initially elevated at 2.46 in the hospital.   -- He will follow up with his outpatient nephrologist 2/29/24.  -- Avoid  nephrotoxic medications. Recheck BMP in one week.     Type 2 diabetes mellitus with stage 3a chronic kidney disease, without long-term current use of insulin ([facility])  Diabetic peripheral neuropathy associated with type 2 diabetes mellitus ([facility])  A1c of 6.5 on 11/13/23.   --continue with metformin 2000 mg daily  --also on jardiance  --gabapentin for neuropathy  --;will check BS once daily for one week and if stable, no need for further checks.     Essential hypertension ([facility])  He currently has SBPs to 170s but will allow him to settle into TCU prior to making adjustments to his meds.   --Continue to monitor blood pressure and heart rate, adjust medications as needed.  -- amlodipine, lisinopril and metoprolol.     PAD (peripheral artery disease) ([facility])  Long term (current) use of anticoagulants  INR was 2.4 upon hospital admission but given the thigh hematoma his Coumadin was held during the hospital stay and he received vitamin K.  Coumadin was restarted at discharge which she has been receiving and we will continue to do his INR is here.    Physical deconditioning  Secondary to recent hospitalization and underlying medical conditions.   --ongoing PT/OT for strengthening.     Total time spent with patient visit at the skilled nursing facility was 45 min including patient visit and review of past records.     Electronically signed by:  RAY Mello CNP                   Sincerely,        RAY Mello CNP

## 2024-01-16 NOTE — PROGRESS NOTES
[facility] GERIATRICS    PRIMARY CARE PROVIDER AND CLINIC:  No primary care provider on file., No primary physician on file  Chief Complaint   Patient presents with    Hospital F/U      [place] Medical Record Number:  [id number]  [facility]) [[id number]]    HPI:   Cory Berg is a 78 y.o. male with PMH of type 2 DM, HTN, peripheral arterial disease s/p femoral bypass graft on warfarin, CKD stage IIIA, secondary hyperparathyroidism (renal origin), urinary incontinence, depression, and mild cognitive impairment, presented to the ED following a fall, found to have a R thigh hematoma. Hemoglobin 6.8 and he received 1 unit of packed red blood cells, also received vitamin K to reverse his INR.  Hemoglobin 8.5 by discharge.  Coumadin held throughout hospital stay and restarted at discharge. Hospital stay at [facility] 1/10/24-1/15/24.  He was admitted to [facility] for further nursing cares and rehab.    London was visited today while in his room initially eating lunch.  He states that his daughter was just visiting as well as his dog Kimberly who found one of his medications on the floor.  He believes that it was one of his heart medications and did not know that it had fallen.  He currently lives at home with his wife and he is eager to return back to home.  He does not feel that he needs to be here.  He feels very tired and would like to take a nap.  He is sleeping well at night.  Has ongoing tenderness to the right thigh and hip area but does not endorse a bruise there.  Does not have pain or discomfort anywhere else.  He has not felt short of breath, chest pain or cough.  Urinating without discomfort and having regular bowel movements.    CODE STATUS/ADVANCE DIRECTIVES DISCUSSION:  No Order  CPR/Full code   ALLERGIES: No Known Allergies   PAST MEDICAL HISTORY: No past medical history on file.   PAST SURGICAL HISTORY:   has a past surgical history  that includes IR Lower Extremity Angiogram Left (9/10/2020).  FAMILY HISTORY: family history is not on file.  SOCIAL HISTORY:     Patient's living condition: lives with spouse    Post Discharge Medication Reconciliation Status:   MED REC REQUIRED  Post Medication Reconciliation Status: discharge medications reconciled, continue medications without change       Current Outpatient Medications   Medication Sig    acetaminophen (TYLENOL) 500 MG tablet Take 1,000 mg by mouth 3 times daily    amitriptyline (ELAVIL) 100 MG tablet Take 100 mg by mouth at bedtime    amLODIPine (NORVASC) 10 MG tablet Take 10 mg by mouth daily    atorvastatin (LIPITOR) 40 MG tablet Take 40 mg by mouth daily    empagliflozin (JARDIANCE) 10 MG TABS tablet Take 10 mg by mouth daily    gabapentin (GRALISE) 300 MG 24 hr tablet Take 300 mg by mouth daily (with dinner)    lisinopril (ZESTRIL) 20 MG tablet Take 20 mg by mouth daily    metFORMIN (GLUCOPHAGE) 1000 MG tablet Take 2,000 mg by mouth daily (with dinner)    metoprolol tartrate (LOPRESSOR) 25 MG tablet Take 25 mg by mouth 2 times daily    sodium bicarbonate 650 MG tablet Take 650 mg by mouth 2 times daily    tamsulosin (FLOMAX) 0.4 MG capsule Take 0.8 mg by mouth daily    WARFARIN SODIUM PO 1/16/24 INR(fingerstick) 1.1  Take Warfarin 7.5mg on 1/16 and 5mg on 1/17.  Next INR 1/18/24.     No current facility-administered medications for this visit.       ROS:  10 point ROS of systems including Constitutional, Eyes, Respiratory, Cardiovascular, Gastroenterology, Genitourinary, Integumentary, Musculoskeletal, Psychiatric were all negative except for pertinent positives noted in my HPI.    Vitals:  BP (!) 158/78   Pulse 86   Temp 98.4  F (36.9  C)   Resp 18   Wt 88.8 kg (195 lb 12.8 oz)   SpO2 93%   Exam:  GENERAL APPEARANCE:  Alert, in no distress, pleasant, cooperative  EYES: no discharge or mattering on lids or lashes noted  ENT:  moist mucous membranes, hearing acuity intact  NECK:  supple, symmetrical  RESP: no respiratory distress, Lung sounds clear, patient is on room air  CV:  rate and rhythm regular, no murmur. Edema 1+ in RLE moreso in the thigh area,   VASCULAR: warm extremities without open areas.  ABDOMEN: normal bowel sounds, soft, nontender.  M/S:   Gait and station ambulates with walker, no tenderness or swelling of the joints; able to move all extremities   SKIN:  Inspection and palpation of skin and subcutaneous tissue: skin warm, dry and intact without rashes  NEURO: no facial asymmetry, no speech deficits and able to follow directions, moves all extremities symmetrically  PSYCH:  insight and judgement intact, memory mild impairment?, affect and mood normal      Lab/Diagnostic data:  Recent labs in [facility] reviewed by me today.     ASSESSMENT/PLAN:  Hematoma of right thigh  Possible osteoporotic nondisplaced left pubic ramus fracture  Acute blood loss anemia, in setting of chronic anemia  Hemoglobin 6.8 which improved to 8.5 after 1 unit of packed red blood cells. Hemoglobin remained stable for remainder of hospitalization.  Orthopedics recommended conservative management for possible left pubic ramus fracture.  Denies pain today.  -- Will obtain another hemoglobin next week  -- Tylenol for discomfort  -- Will get a vitamin D level next week as well    Depression  Mild cognitive impairment  Falls frequently  Has had multiple falls at home, is unhappy about being at the TCU and questionable if he is participating with therapies. Currently lives at home with his spouse.   --may need more assistance upon discharge.   --OT to administer further cog tests.   --ongoing PT.Ot for strengthening.   -- Amitriptyline at bedtime    Stage 3a chronic kidney disease (HRC)  Creatinine initially elevated at 2.46 in the hospital.   -- He will follow up with his outpatient nephrologist 2/29/24.  -- Avoid nephrotoxic medications. Recheck BMP in one week.     Type 2 diabetes mellitus with stage 3a  chronic kidney disease, without long-term current use of insulin (HR)  Diabetic peripheral neuropathy associated with type 2 diabetes mellitus ([facility])  A1c of 6.5 on 11/13/23.   --continue with metformin 2000 mg daily  --also on jardiance  --gabapentin for neuropathy  --;will check BS once daily for one week and if stable, no need for further checks.     Essential hypertension (HR)  He currently has SBPs to 170s but will allow him to settle into TCU prior to making adjustments to his meds.   --Continue to monitor blood pressure and heart rate, adjust medications as needed.  -- amlodipine, lisinopril and metoprolol.     PAD (peripheral artery disease) ([facility])  Long term (current) use of anticoagulants  INR was 2.4 upon hospital admission but given the thigh hematoma his Coumadin was held during the hospital stay and he received vitamin K.  Coumadin was restarted at discharge which she has been receiving and we will continue to do his INR is here.    Physical deconditioning  Secondary to recent hospitalization and underlying medical conditions.   --ongoing PT/OT for strengthening.     Total time spent with patient visit at the skilled nursing facility was 45 min including patient visit and review of past records.     Electronically signed by:  RAY Mello CNP

## 2024-01-18 NOTE — TELEPHONE ENCOUNTER
[facility] Geriatrics Triage Nurse INR     Provider: RAY Garrison CNP   Facility: [facility]   Facility Type:  TCU    Caller: Lizzie  Call Back Number: [phone]  Reason for call: INR  Diagnosis/Goal: PVD    Todays INR: 1.3  Last INR 1/16 INR: 1.1 - 7.5 mg given then 5 mg  1/11 INR: 1.80(hospital)---Warfarin held and Vitamin K given due to hematoma     Heparin/Lovenox:  No  Currently on ABX?: No  Other interacting medication:  None  Missed or refused doses: No    Verbal Order/Direction given by Provider: Coumadin 7.5 mg PO Thurs and Fri. Recheck INR on Saturday via fingerstick    Provider Giving Order:  RAY Garrison CNP     Verbal Order given to: Radha Graff RN

## 2024-01-22 NOTE — PROGRESS NOTES
"[facility] GERIATRICS    Chief Complaint   Patient presents with    RECHECK     HPI:  Cory Berg is a 78 year old  (1945), who is being seen today for an episodic care visit at: [facility] ([facility]) [[id number]].     London was visited today well in his room sitting at the edge of the bed.  He reports that he would like to go home.  He feels that the therapy here is a \"joke\".  Shared that he just needs to be able to do to 12 stairs and then he will be able to go home, he reports \"I can do stairs\".  Pain has improved to the right thigh area.  He denies shortness of breath chest pain or cough.  Urinating without difficulty and having regular bowel movements.  Noted a tremor in his hands which he reports is his baseline.    Allergies, and PMH/PSH reviewed in EPIC today.  REVIEW OF SYSTEMS:  4 point ROS including Respiratory, CV, GI and , other than that noted in the HPI,  is negative    Objective:   BP (!) 175/77   Pulse 86   Temp 98  F (36.7  C)   Resp 18   Wt 90.6 kg (199 lb 12.8 oz)   SpO2 95%   GENERAL APPEARANCE:  Alert, in no distress, pleasant, cooperative  EYES: no discharge or mattering on lids or lashes noted  ENT:  moist mucous membranes, hearing acuity intact  NECK: supple, symmetrical  RESP: no respiratory distress, Lung sounds clear, patient is on room air  CV:  rate and rhythm regular, no murmur. Edema 1+ in RLE moreso in the thigh area,   VASCULAR: warm extremities without open areas.  ABDOMEN: normal bowel sounds, soft, nontender.  M/S:   Gait and station ambulates with walker, no tenderness or swelling of the joints; able to move all extremities   SKIN:  Inspection and palpation of skin and subcutaneous tissue: skin warm, dry and intact without rashes  NEURO: no facial asymmetry, no speech deficits and able to follow directions, moves all extremities symmetrically  PSYCH:  insight and judgement intact, memory mild impairment?, affect and mood normal    Labs reviewed in " epic    Assessment/Plan:  Hematoma of right thigh  Possible osteoporotic nondisplaced left pubic ramus fracture  Acute blood loss anemia, in setting of chronic anemia  Hemoglobin 6.8 which improved to 8.5 after 1 unit of packed red blood cells. Hemoglobin remained stable for remainder of hospitalization.  Orthopedics recommended conservative management for possible left pubic ramus fracture.  Denies pain today.  -- Will obtain another hemoglobin this week  -- Tylenol for discomfort  -- Will get a vitamin D level this week.     Depression  Mild cognitive impairment  Falls frequently  Has had multiple falls at home, is unhappy about being at the TCU and questionable if he is participating with therapies. Currently lives at home with his spouse and has to be able to complete 12 stairs before returning home. He has not allowed for cognitive testing  --may need more assistance upon discharge.   --OT to administer further cog tests.   --ongoing PT.Ot for strengthening.   -- Amitriptyline at bedtime    Stage 3a chronic kidney disease ([facility])  Creatinine initially elevated at 2.46 in the hospital.   -- He will follow up with his outpatient nephrologist 2/29/24.  -- Avoid nephrotoxic medications. Recheck BMP this week    Type 2 diabetes mellitus with stage 3a chronic kidney disease, without long-term current use of insulin ([facility])  Diabetic peripheral neuropathy associated with type 2 diabetes mellitus ([facility])  A1c of 6.5 on 11/13/23.   --continue with metformin 2000 mg daily  --also on jardiance  --gabapentin for neuropathy  --discontinue blood sugar checks.     Essential hypertension ([facility])  He currently has SBPs to 150-170s  --Continue to monitor blood pressure and heart rate, adjust medications as needed.  -- amlodipine, lisinopril and metoprolol. But will increase lisinopril to 30 mg daily     PAD (peripheral artery disease) ([facility])  Long term (current) use of anticoagulants  INR was 2.4 upon hospital admission but given the  thigh hematoma his Coumadin was held during the hospital stay and he received vitamin K.  Coumadin was restarted at discharge which she has been receiving and we will continue to do his INR is here.    Physical deconditioning  Secondary to recent hospitalization and underlying medical conditions.   --ongoing PT/OT for strengthening.     MED REC REQUIRED  Post Medication Reconciliation Status: discharge medications reconciled, continue medications without change      Electronically signed by: RAY Mello CNP

## 2024-01-22 NOTE — LETTER
"    1/22/2024        RE: Cory Berg  [address] [address]        [facility] GERIATRICS    Chief Complaint   Patient presents with     RECHECK     HPI:  Cory Berg is a 78 year old  (1945), who is being seen today for an episodic care visit at: [facility] ([facility]) [[id number]].     [place] was visited today well in his room sitting at the edge of the bed.  He reports that he would like to go home.  He feels that the therapy here is a \"joke\".  Shared that he just needs to be able to do to 12 stairs and then he will be able to go home, he reports \"I can do stairs\".  Pain has improved to the right thigh area.  He denies shortness of breath chest pain or cough.  Urinating without difficulty and having regular bowel movements.  Noted a tremor in his hands which he reports is his baseline.    Allergies, and PMH/PSH reviewed in EPIC today.  REVIEW OF SYSTEMS:  4 point ROS including Respiratory, CV, GI and , other than that noted in the HPI,  is negative    Objective:   BP (!) 175/77   Pulse 86   Temp 98  F (36.7  C)   Resp 18   Wt 90.6 kg (199 lb 12.8 oz)   SpO2 95%   GENERAL APPEARANCE:  Alert, in no distress, pleasant, cooperative  EYES: no discharge or mattering on lids or lashes noted  ENT:  moist mucous membranes, hearing acuity intact  NECK: supple, symmetrical  RESP: no respiratory distress, Lung sounds clear, patient is on room air  CV:  rate and rhythm regular, no murmur. Edema 1+ in RLE moreso in the thigh area,   VASCULAR: warm extremities without open areas.  ABDOMEN: normal bowel sounds, soft, nontender.  M/S:   Gait and station ambulates with walker, no tenderness or swelling of the joints; able to move all extremities   SKIN:  Inspection and palpation of skin and subcutaneous tissue: skin warm, dry and intact without rashes  NEURO: no facial asymmetry, no speech deficits and able to follow directions, moves all extremities symmetrically  PSYCH:  insight and " judgement intact, memory mild impairment?, affect and mood normal    Labs reviewed in epic    Assessment/Plan:  Hematoma of right thigh  Possible osteoporotic nondisplaced left pubic ramus fracture  Acute blood loss anemia, in setting of chronic anemia  Hemoglobin 6.8 which improved to 8.5 after 1 unit of packed red blood cells. Hemoglobin remained stable for remainder of hospitalization.  Orthopedics recommended conservative management for possible left pubic ramus fracture.  Denies pain today.  -- Will obtain another hemoglobin this week  -- Tylenol for discomfort  -- Will get a vitamin D level this week.     Depression  Mild cognitive impairment  Falls frequently  Has had multiple falls at home, is unhappy about being at the TCU and questionable if he is participating with therapies. Currently lives at home with his spouse and has to be able to complete 12 stairs before returning home. He has not allowed for cognitive testing  --may need more assistance upon discharge.   --OT to administer further cog tests.   --ongoing PT.Ot for strengthening.   -- Amitriptyline at bedtime    Stage 3a chronic kidney disease ([facility])  Creatinine initially elevated at 2.46 in the hospital.   -- He will follow up with his outpatient nephrologist 2/29/24.  -- Avoid nephrotoxic medications. Recheck BMP this week    Type 2 diabetes mellitus with stage 3a chronic kidney disease, without long-term current use of insulin ([facility])  Diabetic peripheral neuropathy associated with type 2 diabetes mellitus ([facility])  A1c of 6.5 on 11/13/23.   --continue with metformin 2000 mg daily  --also on jardiance  --gabapentin for neuropathy  --discontinue blood sugar checks.     Essential hypertension ([facility])  He currently has SBPs to 150-170s  --Continue to monitor blood pressure and heart rate, adjust medications as needed.  -- amlodipine, lisinopril and metoprolol. But will increase lisinopril to 30 mg daily     PAD (peripheral artery disease) ([facility])  Long term  (current) use of anticoagulants  INR was 2.4 upon hospital admission but given the thigh hematoma his Coumadin was held during the hospital stay and he received vitamin K.  Coumadin was restarted at discharge which she has been receiving and we will continue to do his INR is here.    Physical deconditioning  Secondary to recent hospitalization and underlying medical conditions.   --ongoing PT/OT for strengthening.     MED REC REQUIRED  Post Medication Reconciliation Status: discharge medications reconciled, continue medications without change      Electronically signed by: RAY Mello CNP               Sincerely,        RAY Mello CNP

## 2024-01-25 NOTE — PROGRESS NOTES
[facility] GERIATRICS DISCHARGE SUMMARY  PATIENT'S NAME: Cory Berg  YOB: 1945  MEDICAL RECORD NUMBER:  [id number]  Place of Service where encounter took place:  [facility] ([facility]) [[id number]]    PRIMARY CARE PROVIDER AND CLINIC RESPONSIBLE AFTER TRANSFER:   Physician No Ref-Primary, No address on file    Non-FMG Provider     Transferring providers: RAY Mello CNP, Dr. Christo Nieves MD  Recent Hospitalization/ED:  Hospital  [facility]  stay 1/10/24 to 1/15/24.  Date of SNF Admission:  1/15/24  Date of SNF (anticipated) Discharge:  1/26/24  Discharged to: previous independent home  Cognitive Scores:  refused cognitive testing  Physical Function:  ambulating with walker  DME: No DME needed    CODE STATUS/ADVANCE DIRECTIVES DISCUSSION:  Full Code   ALLERGIES: Patient has no known allergies.    NURSING FACILITY COURSE   Summary of nursing facility stay:   Cory Berg is a 78 y.o. male with PMH of type 2 DM, HTN, peripheral arterial disease s/p femoral bypass graft on warfarin, CKD stage IIIA, secondary hyperparathyroidism (renal origin), urinary incontinence, depression, and mild cognitive impairment, presented to the ED following a fall, found to have a R thigh hematoma. Hemoglobin 6.8 and he received 1 unit of packed red blood cells, also received vitamin K to reverse his INR.  Hemoglobin 8.5 by discharge.  Coumadin held throughout hospital stay and restarted at discharge. Hospital stay at [facility] 1/10/24-1/15/24.  He was admitted to [facility] for further nursing cares and rehab.     Hematoma of right thigh  Possible osteoporotic nondisplaced left pubic ramus fracture  Acute blood loss anemia, in setting of chronic anemia  Hemoglobin 6.8 which improved to 8.5 after 1 unit of packed red blood cells. Hemoglobin remained stable for remainder of hospitalization.  Orthopedics recommended conservative management for possible left pubic ramus fracture.   Denies pain today.  -- Will obtain another hemoglobin this week  -- Tylenol for discomfort  -- Will get a vitamin D level this week (he refused labdraw)    Depression  Mild cognitive impairment  Falls frequently  Has had multiple falls at home, is unhappy about being at the TCU and questionable if he is participating with therapies. Currently lives at home with his spouse and has to be able to complete 12 stairs before returning home. He has not allowed for cognitive testing  --may need more assistance upon discharge.   --OT to administer further cog tests. (Refused)  --ongoing PT.Ot for strengthening.   -- Amitriptyline at bedtime    Stage 3a chronic kidney disease ([facility])  Creatinine initially elevated at 2.46 in the hospital. He refused further blood draws here at the TCU  -- He will follow up with his outpatient nephrologist 2/29/24.  -- Avoid nephrotoxic medications.     Type 2 diabetes mellitus with stage 3a chronic kidney disease, without long-term current use of insulin ([facility])  Diabetic peripheral neuropathy associated with type 2 diabetes mellitus ([facility])  A1c of 6.5 on 11/13/23.   --continue with metformin 2000 mg daily  --also on jardiance  --gabapentin for neuropathy    Essential hypertension ([facility])  He currently has SBPs to 150-170s  --Continue to monitor blood pressure and heart rate, adjust medications as needed.  -- amlodipine, lisinopril and metoprolol. But his lisinopril was increased to 30 mg daily while at the TCU.     PAD (peripheral artery disease) ([facility])  Long term (current) use of anticoagulants  INR was 2.4 upon hospital admission but given the thigh hematoma his Coumadin was held during the hospital stay and he received vitamin K.  Coumadin was restarted at discharge which he has been receiving and we will continue to do his INR is here.  --is due for INR on 1/26      Physical deconditioning  Secondary to recent hospitalization and underlying medical conditions.   --ongoing PT/OT for  strengthening.     Discharge Medications:  MED REC REQUIRED  Post Medication Reconciliation Status: discharge medications reconciled, continue medications without change       Current Outpatient Medications   Medication Sig Dispense Refill    acetaminophen (TYLENOL) 500 MG tablet Take 1,000 mg by mouth 3 times daily      amitriptyline (ELAVIL) 100 MG tablet Take 100 mg by mouth at bedtime      amLODIPine (NORVASC) 10 MG tablet Take 10 mg by mouth daily      atorvastatin (LIPITOR) 40 MG tablet Take 40 mg by mouth daily      empagliflozin (JARDIANCE) 10 MG TABS tablet Take 10 mg by mouth daily      gabapentin (GRALISE) 300 MG 24 hr tablet Take 300 mg by mouth daily (with dinner)      lisinopril (ZESTRIL) 20 MG tablet Take 30 mg by mouth daily      metFORMIN (GLUCOPHAGE) 1000 MG tablet Take 2,000 mg by mouth daily (with dinner)      metoprolol tartrate (LOPRESSOR) 25 MG tablet Take 25 mg by mouth 2 times daily      sodium bicarbonate 650 MG tablet Take 650 mg by mouth 2 times daily      tamsulosin (FLOMAX) 0.4 MG capsule Take 0.8 mg by mouth daily      WARFARIN SODIUM PO 1/16/24 INR(fingerstick) 1.1  Take Warfarin 7.5mg on 1/16 and 5mg on 1/17.  Next INR 1/18/24.         Controlled medications:   not applicable/none     Past Medical History: No past medical history on file.  Physical Exam:   Vitals: BP (!) 162/83   Pulse 84   Temp 98  F (36.7  C)   Resp 18   Wt 89.4 kg (197 lb 3.2 oz)   SpO2 92%   BMI: There is no height or weight on file to calculate BMI.  GENERAL APPEARANCE:  Alert, in no distress, pleasant, cooperative  EYES: no discharge or mattering on lids or lashes noted  ENT:  moist mucous membranes, hearing acuity intact  NECK: supple, symmetrical  RESP: no respiratory distress, Lung sounds clear, patient is on room air  CV:  rate and rhythm regular, no murmur. Edema 1+ in RLE moreso in the thigh area,   VASCULAR: warm extremities without open areas.  ABDOMEN: normal bowel sounds, soft, nontender.  M/S:    Gait and station ambulates with walker, no tenderness or swelling of the joints; able to move all extremities   SKIN:  Inspection and palpation of skin and subcutaneous tissue: skin warm, dry and intact without rashes  NEURO: no facial asymmetry, no speech deficits and able to follow directions, moves all extremities symmetrically  PSYCH:  insight and judgement intact, memory mild impairment?, affect and mood normal    SNF labs: refused labs at the TCU    DISCHARGE PLAN:  Follow up labs: will need BMP and HGB  Medical Follow Up:      Follow up with primary care provider in 1-2 weeks    Discharge Services: Home Care:  Occupational Therapy and Physical Therapy  Discharge Instructions Verbalized to Patient at Discharge:   None    TOTAL DISCHARGE TIME:   Greater than 30 minutes  Electronically signed by:  RAY Mello CNP     Home care Face to Face documentation done in [employer] attached to Home care orders for [facility].         Documentation of Face-to-Face and Certification for Home Health Services     Patient: Cory Berg   YOB: 1945  MR Number: [id number]  Today's Date: 1/25/2024    I certify that patient: Cory Berg is under my care and that I, or a nurse practitioner or physician's assistant working with me, had a face-to-face encounter that meets the physician face-to-face encounter requirements with this patient on: 1/25/2024.    This encounter with the patient was in whole, or in part, for the following medical condition, which is the primary reason for home health care: right thigh hematoma, physical deconditioning, closed fracture of pubic ramus    I certify that, based on my findings, the following services are medically necessary home health services: Occupational Therapy and Physical Therapy.    My clinical findings support the need for the above services because: Occupational Therapy Services are needed to assess and treat cognitive ability and address ADL safety due  to impairment in cognition. Please assess cognition to ensure that it is a safe environment for him, please assess the need for adaptive equipment. and Physical Therapy Services are needed to assess and treat the following functional impairments: please work on stairs as he has 12 stairs to get upstairs to use the kitchen, high falls risk.    Further, I certify that my clinical findings support that this patient is homebound (i.e. absences from home require considerable and taxing effort and are for medical reasons or Confucianist services or infrequently or of short duration when for other reasons) because: Requires assistance of another person or specialized equipment to access medical services because patient: Requires supervision of another for safe transfer. and  appears to have cognitive impairment but would not allow for cog testing so believe that he would get lost should he travel out on his own..    Based on the above findings. I certify that this patient is confined to the home and needs intermittent skilled nursing care, physical therapy and/or speech therapy.  The patient is under my care, and I have initiated the establishment of the plan of care.  This patient will be followed by a physician who will periodically review the plan of care.  Physician/Provider to provide follow up care: No Ref-Primary, Physician    Responsible Medicare certified [place] Physician: Dr. Christo Nieves  Physician Signature: See electronic signature associated with these discharge orders.  Date: 1/25/2024

## 2024-01-25 NOTE — LETTER
1/25/2024        RE: Cory Berg  [address] [address]        [facility] GERIATRICS DISCHARGE SUMMARY  PATIENT'S NAME: Cory Berg  YOB: 1945  MEDICAL RECORD NUMBER:  [id number]  Place of Service where encounter took place:  [facility] [[id number]]    PRIMARY CARE PROVIDER AND CLINIC RESPONSIBLE AFTER TRANSFER:   Physician No Ref-Primary, No address on file    Non-FMG Provider     Transferring providers: RAY Mello CNP, Dr. Christo Nieves MD  Recent Hospitalization/ED:  Hospital  [facility]  stay 1/10/24 to 1/15/24.  Date of SNF Admission:  1/15/24  Date of SNF (anticipated) Discharge:  1/26/24  Discharged to: previous independent home  Cognitive Scores:  refused cognitive testing  Physical Function:  ambulating with walker  DME: No DME needed    CODE STATUS/ADVANCE DIRECTIVES DISCUSSION:  Full Code   ALLERGIES: Patient has no known allergies.    NURSING FACILITY COURSE   Summary of nursing facility stay:   Cory Berg is a 78 y.o. male with PMH of type 2 DM, HTN, peripheral arterial disease s/p femoral bypass graft on warfarin, CKD stage IIIA, secondary hyperparathyroidism (renal origin), urinary incontinence, depression, and mild cognitive impairment, presented to the ED following a fall, found to have a R thigh hematoma. Hemoglobin 6.8 and he received 1 unit of packed red blood cells, also received vitamin K to reverse his INR.  Hemoglobin 8.5 by discharge.  Coumadin held throughout hospital stay and restarted at discharge. Hospital stay at [facility] 1/10/24-1/15/24.  He was admitted to [facility] for further nursing cares and rehab.     Hematoma of right thigh  Possible osteoporotic nondisplaced left pubic ramus fracture  Acute blood loss anemia, in setting of chronic anemia  Hemoglobin 6.8 which improved to 8.5 after 1 unit of packed red blood cells. Hemoglobin remained stable for remainder of  hospitalization.  Orthopedics recommended conservative management for possible left pubic ramus fracture.  Denies pain today.  -- Will obtain another hemoglobin this week  -- Tylenol for discomfort  -- Will get a vitamin D level this week (he refused labdraw)    Depression  Mild cognitive impairment  Falls frequently  Has had multiple falls at home, is unhappy about being at the TCU and questionable if he is participating with therapies. Currently lives at home with his spouse and has to be able to complete 12 stairs before returning home. He has not allowed for cognitive testing  --may need more assistance upon discharge.   --OT to administer further cog tests. (Refused)  --ongoing PT.Ot for strengthening.   -- Amitriptyline at bedtime    Stage 3a chronic kidney disease ([facility])  Creatinine initially elevated at 2.46 in the hospital. He refused further blood draws here at the TCU  -- He will follow up with his outpatient nephrologist 2/29/24.  -- Avoid nephrotoxic medications.     Type 2 diabetes mellitus with stage 3a chronic kidney disease, without long-term current use of insulin ([facility])  Diabetic peripheral neuropathy associated with type 2 diabetes mellitus ([facility])  A1c of 6.5 on 11/13/23.   --continue with metformin 2000 mg daily  --also on jardiance  --gabapentin for neuropathy    Essential hypertension ([facility])  He currently has SBPs to 150-170s  --Continue to monitor blood pressure and heart rate, adjust medications as needed.  -- amlodipine, lisinopril and metoprolol. But his lisinopril was increased to 30 mg daily while at the TCU.     PAD (peripheral artery disease) ([facility])  Long term (current) use of anticoagulants  INR was 2.4 upon hospital admission but given the thigh hematoma his Coumadin was held during the hospital stay and he received vitamin K.  Coumadin was restarted at discharge which he has been receiving and we will continue to do his INR is here.  --is due for INR on 1/26      Physical  deconditioning  Secondary to recent hospitalization and underlying medical conditions.   --ongoing PT/OT for strengthening.     Discharge Medications:  MED REC REQUIRED  Post Medication Reconciliation Status: discharge medications reconciled, continue medications without change       Current Outpatient Medications   Medication Sig Dispense Refill     acetaminophen (TYLENOL) 500 MG tablet Take 1,000 mg by mouth 3 times daily       amitriptyline (ELAVIL) 100 MG tablet Take 100 mg by mouth at bedtime       amLODIPine (NORVASC) 10 MG tablet Take 10 mg by mouth daily       atorvastatin (LIPITOR) 40 MG tablet Take 40 mg by mouth daily       empagliflozin (JARDIANCE) 10 MG TABS tablet Take 10 mg by mouth daily       gabapentin (GRALISE) 300 MG 24 hr tablet Take 300 mg by mouth daily (with dinner)       lisinopril (ZESTRIL) 20 MG tablet Take 30 mg by mouth daily       metFORMIN (GLUCOPHAGE) 1000 MG tablet Take 2,000 mg by mouth daily (with dinner)       metoprolol tartrate (LOPRESSOR) 25 MG tablet Take 25 mg by mouth 2 times daily       sodium bicarbonate 650 MG tablet Take 650 mg by mouth 2 times daily       tamsulosin (FLOMAX) 0.4 MG capsule Take 0.8 mg by mouth daily       WARFARIN SODIUM PO 1/16/24 INR(fingerstick) 1.1  Take Warfarin 7.5mg on 1/16 and 5mg on 1/17.  Next INR 1/18/24.         Controlled medications:   not applicable/none     Past Medical History: No past medical history on file.  Physical Exam:   Vitals: BP (!) 162/83   Pulse 84   Temp 98  F (36.7  C)   Resp 18   Wt 89.4 kg (197 lb 3.2 oz)   SpO2 92%   BMI: There is no height or weight on file to calculate BMI.  GENERAL APPEARANCE:  Alert, in no distress, pleasant, cooperative  EYES: no discharge or mattering on lids or lashes noted  ENT:  moist mucous membranes, hearing acuity intact  NECK: supple, symmetrical  RESP: no respiratory distress, Lung sounds clear, patient is on room air  CV:  rate and rhythm regular, no murmur. Edema 1+ in RLE moreso in  the thigh area,   VASCULAR: warm extremities without open areas.  ABDOMEN: normal bowel sounds, soft, nontender.  M/S:   Gait and station ambulates with walker, no tenderness or swelling of the joints; able to move all extremities   SKIN:  Inspection and palpation of skin and subcutaneous tissue: skin warm, dry and intact without rashes  NEURO: no facial asymmetry, no speech deficits and able to follow directions, moves all extremities symmetrically  PSYCH:  insight and judgement intact, memory mild impairment?, affect and mood normal    SNF labs: refused labs at the TCU    DISCHARGE PLAN:  Follow up labs: will need BMP and HGB  Medical Follow Up:      Follow up with primary care provider in 1-2 weeks    Discharge Services: Home Care:  Occupational Therapy and Physical Therapy  Discharge Instructions Verbalized to Patient at Discharge:   None    TOTAL DISCHARGE TIME:   Greater than 30 minutes  Electronically signed by:  RAY Mello CNP     Home care Face to Face documentation done in EPIC attached to Home care orders for [facility].         Documentation of Face-to-Face and Certification for Home Health Services     Patient: Cory Berg   YOB: 1945  MR Number: [id number]  Today's Date: 1/25/2024    I certify that patient: Cory Berg is under my care and that I, or a nurse practitioner or physician's assistant working with me, had a face-to-face encounter that meets the physician face-to-face encounter requirements with this patient on: 1/25/2024.    This encounter with the patient was in whole, or in part, for the following medical condition, which is the primary reason for home health care: right thigh hematoma, physical deconditioning, closed fracture of pubic ramus    I certify that, based on my findings, the following services are medically necessary home health services: Occupational Therapy and Physical Therapy.    My clinical findings support the need for the above  services because: Occupational Therapy Services are needed to assess and treat cognitive ability and address ADL safety due to impairment in cognition. Please assess cognition to ensure that it is a safe environment for him, please assess the need for adaptive equipment. and Physical Therapy Services are needed to assess and treat the following functional impairments: please work on stairs as he has 12 stairs to get upstairs to use the kitchen, high falls risk.    Further, I certify that my clinical findings support that this patient is homebound (i.e. absences from home require considerable and taxing effort and are for medical reasons or Alevism services or infrequently or of short duration when for other reasons) because: Requires assistance of another person or specialized equipment to access medical services because patient: Requires supervision of another for safe transfer. and  appears to have cognitive impairment but would not allow for cog testing so believe that he would get lost should he travel out on his own..    Based on the above findings. I certify that this patient is confined to the home and needs intermittent skilled nursing care, physical therapy and/or speech therapy.  The patient is under my care, and I have initiated the establishment of the plan of care.  This patient will be followed by a physician who will periodically review the plan of care.  Physician/Provider to provide follow up care: No Ref-Primary, Physician    Responsible Medicare certified [facility] Physician: Dr. Christo Nieves  Physician Signature: See electronic signature associated with these discharge orders.  Date: 1/25/2024                    Sincerely,        RAY Mello CNP

## 2024-01-26 NOTE — TELEPHONE ENCOUNTER
[facility] Geriatrics Triage Nurse INR     Provider: RAY Garrison CNP   Facility: [facility]   Facility Type:  TCU    Caller: Mandie  Call Back Number: [phone]  Reason for call: INR  Diagnosis/Goal: PVD    Todays INR: 2.1  Last INR 1/22  2.2--7.5mg every day   1/20  1.9--7.5mg  1/18  1.3--7.5mg     Heparin/Lovenox:  No  Currently on ABX?: No  Other interacting medication:  None  Missed or refused doses: No    Verbal Order/Direction given by Provider: Cont 7.5mg every day Next INR early next week. Mon- Tues     Provider Giving Order:  RAY Garrison CNP     Verbal Order given to: Mandie Pinto RN

## 2024-07-05 NOTE — LETTER
7/5/2024      Cory Berg  [address] [address]        [facility] GERIATRICS    PRIMARY CARE PROVIDER AND CLINIC:  Physician No Ref-Primary, No address on file  Chief Complaint   Patient presents with     Hospital F/U      [place] Medical Record Number:  [id number]  Place of Service where encounter took place:  [facility] ([facility]) [[id number]]    Cory Berg  is a 79 year old (1945) male with a medical history of type 2 DM, HTN, peripheral arterial disease s/p femoral bypass graft on warfarin, CKD stage IIIA, secondary hyperparathyroidism (renal origin), urinary incontinence, depression. He was at [facility] back in January after having anemia secondary to thigh hematoma.  He returned home where he lives with his wife and unfortunately has had a series of hospitalizations since that time.  Please see below.    He was admitted to [facility] on 3/6/2024 for fall resulting in femoral neck fracture. Transferred to transitional care facility ([facility]) on 3/15/2024 for rehabilitation and clinical monitoring.     Subsequently readmitted to [facility] on 3/21/2024 for altered mental status and slurred speech. Transferred back to transitional care facility ([facility]) on 3/26/2024 for rehabilitation and clinical monitoring.    Again readmitted to [facility] on 4/4/2024 for AMS secondary to UTI. Returned to this transitional care facility on 4/7/2024.     Admitted to [facility] with stay 6/26/2024 through 7/2/2024 after having a syncopal episode with hypotension at the TCU.  He was found to have another urinary tract infection which was initially treated with cefepime then narrowed to amoxicillin.  Slums score 17 out of 30 while in the hospital.  TCU recommended family chose [facility] as he will remain in long-term care after rehab.  Waxing and waning behaviors in which she was started on scheduled Seroquel and is improved.  Of  note did have ongoing anemia with iron studies showing chronic disease.  He was given 1 unit of PRBCs and hemoglobin back to 9.7 at discharge.    Cory was visited today while in his room.  He is attempting to take his shorts off so he can put his pants on has he feels chilly.  He is having discomfort in his left leg which is where he fractured his femur in March.  He ate breakfast today.  Does not feel short of breath.  No complaints with urination.    CODE STATUS/ADVANCE DIRECTIVES DISCUSSION:  Full Code  CPR/Full code   ALLERGIES: No Known Allergies   PAST MEDICAL HISTORY: No past medical history on file.   PAST SURGICAL HISTORY:   has a past surgical history that includes IR Lower Extremity Angiogram Left (9/10/2020).  FAMILY HISTORY: family history is not on file.  SOCIAL HISTORY:     Patient's living condition: lives with spouse    Post Discharge Medication Reconciliation Status:   MED REC REQUIRED  Post Medication Reconciliation Status: discharge medications reconciled, continue medications without change       Current Outpatient Medications   Medication Sig Dispense Refill     acetaminophen (TYLENOL) 325 MG tablet Take 650 mg by mouth 3 times daily       amLODIPine (NORVASC) 10 MG tablet Take 10 mg by mouth daily       atorvastatin (LIPITOR) 40 MG tablet Take 40 mg by mouth daily       Calcium Carbonate Antacid 600 MG CHEW Take 1 tablet by mouth daily       empagliflozin (JARDIANCE) 10 MG TABS tablet Take 10 mg by mouth daily       gabapentin (NEURONTIN) 300 MG capsule Take 1 capsule (300 mg) by mouth at bedtime       Lidocaine (LIDOCARE) 4 % Patch Place 1 patch onto the skin every 24 hours To prevent lidocaine toxicity, patient should be patch free for 12 hrs daily.       lisinopril (ZESTRIL) 20 MG tablet Take 20 mg by mouth daily       metoprolol tartrate (LOPRESSOR) 25 MG tablet Take 100 mg by mouth 2 times daily       polyethylene glycol (MIRALAX) 17 g packet Take 17 g by mouth daily       QUEtiapine  (SEROQUEL) 25 MG tablet Take 12.5 mg by mouth daily       QUEtiapine (SEROQUEL) 25 MG tablet Take 6.25 mg by mouth daily       sennosides (SENOKOT) 8.6 MG tablet Take 1 tablet by mouth 2 times daily       sodium bicarbonate 650 MG tablet Take 650 mg by mouth 2 times daily       tamsulosin (FLOMAX) 0.4 MG capsule Take 0.4 mg by mouth daily       warfarin ANTICOAGULANT (COUMADIN) 5 MG tablet Take by mouth daily Give as directed per Facility MR       No current facility-administered medications for this visit.       ROS:  Limited secondary to cognitive impairment but today pt reports as noted above.    Vitals:  BP (!) 181/74   Pulse 69   Temp 97.5  F (36.4  C)   Resp 18   SpO2 (!) 70%   Exam:  GENERAL APPEARANCE:  Alert, in no distress, pleasant, cooperative  EYES: no discharge or mattering on lids or lashes noted  ENT:  moist mucous membranes, hearing acuity intact  NECK: supple, symmetrical  RESP: no respiratory distress, Lung sounds clear, patient is on room air  CV:  rate and rhythm regular, no murmur. Edema none in lower extremities.  VASCULAR: warm extremities without open areas.  ABDOMEN: normal bowel sounds, soft, nontender.  M/S:   Gait and station ambulates with walker, no tenderness or swelling of the joints; able to move all extremities   SKIN:  Inspection and palpation of skin and subcutaneous tissue: skin warm, dry and intact without rashes  NEURO: no facial asymmetry, no speech deficits and able to follow directions, moves all extremities symmetrically  PSYCH:  insight and judgement intact, memory impaired, affect and mood normal    Lab/Diagnostic data:  Recent labs in [facility] reviewed by me today.     ASSESSMENT/PLAN:  Urinary tract infection  Recurrent. Urine culture grew Enterococcus, cefepime narrowed to amoxicillin   --continue amoxicillin with last dose on 7/2/24    Depression  Dementia  SLUMS 17/30. Appears cognition has worsend since he was here in January 2024. Likely due to infection and  frequent hospital/TCU stays over the past 3-4 months.   --Continue with seroquel 6.25 mg at 4 pm, 12.5 mg in the evening, and 6.25 mg at bedtime daily   --will likely be moving to LTC after rehab.    Anemia  Acute on chronic. Had left thigh hematoma back in January then low hgb after hip fracture in March. HGB 6.9 upon this most recent hospital admission. Iron studies consistent with acute infection and chronic disease. Received 1 unit of PRBC and HGB back up yo 9.7 at hospital discharge.   --check HGB early next week.     Stage 4 Chronic kidney disease   Baseline creatinine around 1.6-1.7  -- Avoid nephrotoxic medications. Recheck BMP next week.   -- sodium bicarbonate BID    Type 2 diabetes mellitus with stage 3a chronic kidney disease, without long-term current use of insulin ([facility])  Diabetic peripheral neuropathy associated with type 2 diabetes mellitus ([facility])  A1c of 6.5 on 11/13/23.   --Continue with jardiance  --gabapentin for neuropathy  --change glucose monitoring from TID to once daily alternating times.     Essential hypertension ([facility])  He currently has SBPs uncontrolled with readings up to 120-170s. Will allow to settle in before adjusting medications.   --Continue to monitor blood pressure and heart rate, adjust medications as needed.  -- amlodipine, lisinopril and metoprolol.     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 Surveillance of lower extremity U/S on 6/20 without significant change from November 2023. 50-75% stenosis of distal anastomosis of left LE bypass graft.   INR was 2.0 today (up from 1.3 at discharge). He received 5 mg on 7/3-7/4, received 7.5 mg x 3 while in the hospital.  --5 mg on Fr/Sa and recheck INR on Sunday.      Physical deconditioning  Secondary to recent hospitalization and underlying medical conditions.   --ongoing PT/OT for strengthening.     Total time spent with patient  visit was 45 min including patient visit and review of past records. Greater than 50% of total time spent with counseling and coordinating care due to recurrent UTI, anemia, dementia, discharge planning.    Electronically signed by:  RAY Mello CNP                   Sincerely,        RAY Mello CNP

## 2024-07-05 NOTE — PROGRESS NOTES
[facility] GERIATRICS    PRIMARY CARE PROVIDER AND CLINIC:  Physician No Ref-Primary, No address on file  Chief Complaint   Patient presents with    Hospital F/U      [place] Medical Record Number:  [id number]  Place of Service where encounter took place:  [facility] ([facility]) [[id number]]    Cory Berg  is a 79 year old (1945) male with a medical history of type 2 DM, HTN, peripheral arterial disease s/p femoral bypass graft on warfarin, CKD stage IIIA, secondary hyperparathyroidism (renal origin), urinary incontinence, depression. He was at [facility] back in January after having anemia secondary to thigh hematoma.  He returned home where he lives with his wife and unfortunately has had a series of hospitalizations since that time.  Please see below.    He was admitted to [facility] on 3/6/2024 for fall resulting in femoral neck fracture. Transferred to transitional care facility ([facility]) on 3/15/2024 for rehabilitation and clinical monitoring.     Subsequently readmitted to [facility] on 3/21/2024 for altered mental status and slurred speech. Transferred back to transitional care facility ([facility]) on 3/26/2024 for rehabilitation and clinical monitoring.    Again readmitted to [facility] on 4/4/2024 for AMS secondary to UTI. Returned to this transitional care facility on 4/7/2024.     Admitted to [facility] with stay 6/26/2024 through 7/2/2024 after having a syncopal episode with hypotension at the TCU.  He was found to have another urinary tract infection which was initially treated with cefepime then narrowed to amoxicillin.  Slums score 17 out of 30 while in the hospital.  [facility] recommended [facility] as he will remain in long-term care after rehab.  Waxing and waning behaviors in which she was started on scheduled Seroquel and is improved.  Of note did have ongoing anemia with iron studies showing chronic disease.  He was  given 1 unit of PRBCs and hemoglobin back to 9.7 at discharge.    Cory was visited today while in his room.  He is attempting to take his shorts off so he can put his pants on has he feels chilly.  He is having discomfort in his left leg which is where he fractured his femur in March.  He ate breakfast today.  Does not feel short of breath.  No complaints with urination.    CODE STATUS/ADVANCE DIRECTIVES DISCUSSION:  Full Code  CPR/Full code   ALLERGIES: No Known Allergies   PAST MEDICAL HISTORY: No past medical history on file.   PAST SURGICAL HISTORY:   has a past surgical history that includes IR Lower Extremity Angiogram Left (9/10/2020).  FAMILY HISTORY: family history is not on file.  SOCIAL HISTORY:     Patient's living condition: lives with spouse    Post Discharge Medication Reconciliation Status:   MED REC REQUIRED  Post Medication Reconciliation Status: discharge medications reconciled, continue medications without change       Current Outpatient Medications   Medication Sig Dispense Refill    acetaminophen (TYLENOL) 325 MG tablet Take 650 mg by mouth 3 times daily      amLODIPine (NORVASC) 10 MG tablet Take 10 mg by mouth daily      atorvastatin (LIPITOR) 40 MG tablet Take 40 mg by mouth daily      Calcium Carbonate Antacid 600 MG CHEW Take 1 tablet by mouth daily      empagliflozin (JARDIANCE) 10 MG TABS tablet Take 10 mg by mouth daily      gabapentin (NEURONTIN) 300 MG capsule Take 1 capsule (300 mg) by mouth at bedtime      Lidocaine (LIDOCARE) 4 % Patch Place 1 patch onto the skin every 24 hours To prevent lidocaine toxicity, patient should be patch free for 12 hrs daily.      lisinopril (ZESTRIL) 20 MG tablet Take 20 mg by mouth daily      metoprolol tartrate (LOPRESSOR) 25 MG tablet Take 100 mg by mouth 2 times daily      polyethylene glycol (MIRALAX) 17 g packet Take 17 g by mouth daily      QUEtiapine (SEROQUEL) 25 MG tablet Take 12.5 mg by mouth daily      QUEtiapine (SEROQUEL) 25 MG tablet  Take 6.25 mg by mouth daily      sennosides (SENOKOT) 8.6 MG tablet Take 1 tablet by mouth 2 times daily      sodium bicarbonate 650 MG tablet Take 650 mg by mouth 2 times daily      tamsulosin (FLOMAX) 0.4 MG capsule Take 0.4 mg by mouth daily      warfarin ANTICOAGULANT (COUMADIN) 5 MG tablet Take by mouth daily Give as directed per Facility MR       No current facility-administered medications for this visit.       ROS:  Limited secondary to cognitive impairment but today pt reports as noted above.    Vitals:  BP (!) 181/74   Pulse 69   Temp 97.5  F (36.4  C)   Resp 18   SpO2 (!) 70%   Exam:  GENERAL APPEARANCE:  Alert, in no distress, pleasant, cooperative  EYES: no discharge or mattering on lids or lashes noted  ENT:  moist mucous membranes, hearing acuity intact  NECK: supple, symmetrical  RESP: no respiratory distress, Lung sounds clear, patient is on room air  CV:  rate and rhythm regular, no murmur. Edema none in lower extremities.  VASCULAR: warm extremities without open areas.  ABDOMEN: normal bowel sounds, soft, nontender.  M/S:   Gait and station ambulates with walker, no tenderness or swelling of the joints; able to move all extremities   SKIN:  Inspection and palpation of skin and subcutaneous tissue: skin warm, dry and intact without rashes  NEURO: no facial asymmetry, no speech deficits and able to follow directions, moves all extremities symmetrically  PSYCH:  insight and judgement intact, memory impaired, affect and mood normal    Lab/Diagnostic data:  Recent labs in [facility] reviewed by me today.     ASSESSMENT/PLAN:  Urinary tract infection  Recurrent. Urine culture grew Enterococcus, cefepime narrowed to amoxicillin   --continue amoxicillin with last dose on 7/2/24    Depression  Dementia  SLUMS 17/30. Appears cognition has worsend since he was here in January 2024. Likely due to infection and frequent hospital/TCU stays over the past 3-4 months.   --Continue with seroquel 6.25 mg at 4 pm, 12.5  mg in the evening, and 6.25 mg at bedtime daily   --will likely be moving to LTC after rehab.    Anemia  Acute on chronic. Had left thigh hematoma back in January then low hgb after hip fracture in March. HGB 6.9 upon this most recent hospital admission. Iron studies consistent with acute infection and chronic disease. Received 1 unit of PRBC and HGB back up yo 9.7 at hospital discharge.   --check HGB early next week.     Stage 4 Chronic kidney disease   Baseline creatinine around 1.6-1.7  -- Avoid nephrotoxic medications. Recheck BMP next week.   -- sodium bicarbonate BID    Type 2 diabetes mellitus with stage 3a chronic kidney disease, without long-term current use of insulin ([facility])  Diabetic peripheral neuropathy associated with type 2 diabetes mellitus ([facility])  A1c of 6.5 on 11/13/23.   --Continue with jardiance  --gabapentin for neuropathy  --change glucose monitoring from TID to once daily alternating times.     Essential hypertension ([facility])  He currently has SBPs uncontrolled with readings up to 120-170s. Will allow to settle in before adjusting medications.   --Continue to monitor blood pressure and heart rate, adjust medications as needed.  -- amlodipine, lisinopril and metoprolol.     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 Surveillance of lower extremity U/S on 6/20 without significant change from November 2023. 50-75% stenosis of distal anastomosis of left LE bypass graft.   INR was 2.0 today (up from 1.3 at discharge). He received 5 mg on 7/3-7/4, received 7.5 mg x 3 while in the hospital.  --5 mg on Fr/Sa and recheck INR on Sunday.      Physical deconditioning  Secondary to recent hospitalization and underlying medical conditions.   --ongoing PT/OT for strengthening.     Total time spent with patient visit was 45 min including patient visit and review of past records. Greater than 50% of total time  spent with counseling and coordinating care due to recurrent UTI, anemia, dementia, discharge planning.    Electronically signed by:  RAY Mello CNP

## 2024-07-08 NOTE — LETTER
" 7/8/2024      Cory Berg  [address] [address]        [facility] GERIATRICS    Chief Complaint   Patient presents with     RECHECK     HPI:  Cory Berg is a 79 year old  (1945), who is being seen today for an episodic care visit at: [facility] ([facility]) [[id number]]. Today's concern is:     [place] was visited today well in the hallway so propelling his wheelchair back to the room.  He has pain in the left leg.  States that he was outside and was able to ride on the elevator independently and now wheeling himself back to the room.  He feels tired and asks NP to wheel him back to his room.  He ate breakfast well.  He denies complaints of chest pain or shortness of breath.  He has been calm and pleasant with nursing care staff but reports from nurse manager today that [place] was combative during therapy and threw his walker but was redirectable.    Allergies, and PMH/PSH reviewed in EPIC today.  REVIEW OF SYSTEMS:  4 point ROS including Respiratory, CV, GI and , other than that noted in the HPI,  is negative    Objective:   BP (!) 167/77   Pulse 71   Temp 97.5  F (36.4  C)   Resp 18   Ht 1.778 m (5' 10\")   Wt 83.9 kg (185 lb)   SpO2 93%   BMI 26.54 kg/m    GENERAL APPEARANCE:  Alert, in no distress, pleasant, cooperative  EYES: no discharge or mattering on lids or lashes noted  ENT:  moist mucous membranes, hearing acuity intact  NECK: supple, symmetrical  RESP: no respiratory distress, Lung sounds clear, patient is on room air  CV:  rate and rhythm regular, no murmur. Edema none in lower extremities.  VASCULAR: warm extremities without open areas.  ABDOMEN: normal bowel sounds, soft, nontender.  M/S:   Gait and station ambulates with walker, no tenderness or swelling of the joints; able to move all extremities   SKIN:  Inspection and palpation of skin and subcutaneous tissue: skin warm, dry and intact without rashes  NEURO: no facial asymmetry, no speech deficits and " able to follow directions, moves all extremities symmetrically  PSYCH:  insight and judgement intact, memory impaired, affect and mood normal    CBC RESULTS:   Recent Labs   Lab Test 07/08/24  0541   WBC 8.1   RBC 3.33*   HGB 9.7*   HCT 30.2*   MCV 91   MCH 29.1   MCHC 32.1   RDW 14.3          Last Basic Metabolic Panel:  Recent Labs   Lab Test 07/08/24  0541      POTASSIUM 4.1   CHLORIDE 110*   MATHIEU 8.3*   CO2 25   BUN 47.0*   CR 2.15*   *       Assessment/Plan:  Stage 4 Chronic kidney disease   Baseline creatinine around 1.6-1.7 and was increased up to 2.15 today with a BUN of 47 so could be dry so will encourage fluids.   -- Avoid nephrotoxic medications. Recheck BMP on 7/11.  -- sodium bicarbonate BID    Depression  Dementia  SLUMS 17/30. Appears cognition has worsend since he was here in January 2024. Decline in cognitive scores likely due to infection and frequent hospital/TCU stays over the past 3-4 months.   --Continue with seroquel 6.25 mg at 4 pm, 12.5 mg in the evening, and 6.25 mg at bedtime daily   --will likely be moving to LTC after rehab.  -- If irritable behaviors continues with therapy or any other staff member will add a daily dose of Seroquel but will leave the doses as scheduled at this time as it was only 1 episode and he was redirectable.    Anemia  Acute on chronic. Had left thigh hematoma back in January then low hgb after hip fracture in March. HGB 6.9 upon this most recent hospital admission. Iron studies consistent with acute infection and chronic disease. Received 1 unit of PRBC and HGB back up yo 9.7 at hospital discharge and was 9/7 today.   --follow clinically    Type 2 diabetes mellitus with stage 3a chronic kidney disease, without long-term current use of insulin ([facility])  Diabetic peripheral neuropathy associated with type 2 diabetes mellitus ([facility])  A1c of 6.5 on 11/13/23.   --Continue with jardiance  --gabapentin for neuropathy  --change glucose monitoring from TID  to once daily alternating times.     Essential hypertension ([facility])  He currently has SBPs uncontrolled with readings up to 170s.   --Continue to monitor blood pressure and heart rate, adjust medications as needed.  -- amlodipine, lisinopril (may need to discontinue later this week if creatinine does not improve) and metoprolol.   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 Surveillance of lower extremity U/S on 6/20 without significant change from November 2023. 50-75% stenosis of distal anastomosis of left LE bypass graft.   INR was 2.0 today (up from 1.3 at discharge).   He received 5 mg on 7/3-7/4, received 7.5 mg x 3 while in the hospital and 5mg Fr/Sa, 7.5 mg on Sunday.   --5 mg daily with recheck INR on 7/11.    Urinary tract infection  Recurrent. Urine culture grew Enterococcus, cefepime narrowed to amoxicillin which was completed on 7/2.      Physical deconditioning  Secondary to recent hospitalization and underlying medical conditions.   --ongoing PT/OT for strengthening.     MED REC REQUIRED  Post Medication Reconciliation Status: discharge medications reconciled and changed, per note/orders      Electronically signed by: RAY Mello CNP         Sincerely,        RAY Mello CNP

## 2024-07-08 NOTE — PROGRESS NOTES
"[facility] GERIATRICS    Chief Complaint   Patient presents with    RECHECK     HPI:  Cory Berg is a 79 year old  (1945), who is being seen today for an episodic care visit at: [facility] ([facility]) [[id number]]. Today's concern is:     [place] was visited today well in the hallway so propelling his wheelchair back to the room.  He has pain in the left leg.  States that he was outside and was able to ride on the elevator independently and now wheeling himself back to the room.  He feels tired and asks NP to wheel him back to his room.  He ate breakfast well.  He denies complaints of chest pain or shortness of breath.  He has been calm and pleasant with nursing care staff but reports from nurse manager today that [place] was combative during therapy and threw his walker but was redirectable.    Allergies, and PMH/PSH reviewed in EPIC today.  REVIEW OF SYSTEMS:  4 point ROS including Respiratory, CV, GI and , other than that noted in the HPI,  is negative    Objective:   BP (!) 167/77   Pulse 71   Temp 97.5  F (36.4  C)   Resp 18   Ht 1.778 m (5' 10\")   Wt 83.9 kg (185 lb)   SpO2 93%   BMI 26.54 kg/m    GENERAL APPEARANCE:  Alert, in no distress, pleasant, cooperative  EYES: no discharge or mattering on lids or lashes noted  ENT:  moist mucous membranes, hearing acuity intact  NECK: supple, symmetrical  RESP: no respiratory distress, Lung sounds clear, patient is on room air  CV:  rate and rhythm regular, no murmur. Edema none in lower extremities.  VASCULAR: warm extremities without open areas.  ABDOMEN: normal bowel sounds, soft, nontender.  M/S:   Gait and station ambulates with walker, no tenderness or swelling of the joints; able to move all extremities   SKIN:  Inspection and palpation of skin and subcutaneous tissue: skin warm, dry and intact without rashes  NEURO: no facial asymmetry, no speech deficits and able to follow directions, moves all extremities symmetrically  PSYCH:  insight " and judgement intact, memory impaired, affect and mood normal    CBC RESULTS:   Recent Labs   Lab Test 07/08/24  0541   WBC 8.1   RBC 3.33*   HGB 9.7*   HCT 30.2*   MCV 91   MCH 29.1   MCHC 32.1   RDW 14.3          Last Basic Metabolic Panel:  Recent Labs   Lab Test 07/08/24  0541      POTASSIUM 4.1   CHLORIDE 110*   MATHIEU 8.3*   CO2 25   BUN 47.0*   CR 2.15*   *       Assessment/Plan:  Stage 4 Chronic kidney disease   Baseline creatinine around 1.6-1.7 and was increased up to 2.15 today with a BUN of 47 so could be dry so will encourage fluids.   -- Avoid nephrotoxic medications. Recheck BMP on 7/11.  -- sodium bicarbonate BID    Depression  Dementia  SLUMS 17/30. Appears cognition has worsend since he was here in January 2024. Decline in cognitive scores likely due to infection and frequent hospital/TCU stays over the past 3-4 months.   --Continue with seroquel 6.25 mg at 4 pm, 12.5 mg in the evening, and 6.25 mg at bedtime daily   --will likely be moving to LTC after rehab.  -- If irritable behaviors continues with therapy or any other staff member will add a daily dose of Seroquel but will leave the doses as scheduled at this time as it was only 1 episode and he was redirectable.    Anemia  Acute on chronic. Had left thigh hematoma back in January then low hgb after hip fracture in March. HGB 6.9 upon this most recent hospital admission. Iron studies consistent with acute infection and chronic disease. Received 1 unit of PRBC and HGB back up yo 9.7 at hospital discharge and was 9/7 today.   --follow clinically    Type 2 diabetes mellitus with stage 3a chronic kidney disease, without long-term current use of insulin ([facility])  Diabetic peripheral neuropathy associated with type 2 diabetes mellitus (HR)  A1c of 6.5 on 11/13/23.   --Continue with jardiance  --gabapentin for neuropathy  --change glucose monitoring from TID to once daily alternating times.     Essential hypertension ([facility])  He currently  has SBPs uncontrolled with readings up to 170s.   --Continue to monitor blood pressure and heart rate, adjust medications as needed.  -- amlodipine, lisinopril (may need to discontinue later this week if creatinine does not improve) and metoprolol.   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 Surveillance of lower extremity U/S on 6/20 without significant change from November 2023. 50-75% stenosis of distal anastomosis of left LE bypass graft.   INR was 2.0 today (up from 1.3 at discharge).   He received 5 mg on 7/3-7/4, received 7.5 mg x 3 while in the hospital and 5mg Fr/Sa, 7.5 mg on Sunday.   --5 mg daily with recheck INR on 7/11.    Urinary tract infection  Recurrent. Urine culture grew Enterococcus, cefepime narrowed to amoxicillin which was completed on 7/2.      Physical deconditioning  Secondary to recent hospitalization and underlying medical conditions.   --ongoing PT/OT for strengthening.     MED REC REQUIRED  Post Medication Reconciliation Status: discharge medications reconciled and changed, per note/orders      Electronically signed by: RAY Mello CNP

## 2024-07-09 NOTE — PROGRESS NOTES
[facility] GERIATRICS    PRIMARY CARE PROVIDER AND CLINIC:  Physician No Ref-Primary, No address on file    Chief Complaint   Patient presents with    Hospital F/U      [place] Medical Record Number:  [id number]  Place of Service where encounter took place:  [facility] ([facility])     Cory Berg  is a 79 year old  (1945), admitted to the above facility from  [facility] . Hospital stay 6/26/24 through 7/2/24..     Patient is a past medical history of cognitive impairment, hypertension, PAD status post femoral bypass graft on warfarin, history of atrial fibrillation, history of thigh hematoma, chronic disease stage IIIa, depression.    He has had a complex recent medical history with multiple hospitalizations and TCU placements over the last few months.  This is well-documented in the nurse practitioner note.    He most recently was hospitalized for the evaluation of syncope with hypotension at his TCU.  He was found to have UTI.  He was found to have acute on chronic anemia, felt secondary to chronic disease.  Slums 17 out of 30 it was felt that he will ultimately discharge to long-term care.  He was noted to have waxing waning behavior concerns during hospitalization for which he was started on Seroquel with improvement.    Of note, since admission to the TCU, patient's creatinine has been noted to be significantly elevated with creatinine 2.15 baseline is in the mid 1 range.  He currently is receiving lisinopril 20 mg daily which is a chronic medication for him.  Blood pressures have been mildly elevated on current regimen of amlodipine, lisinopril, metoprolol  Hydralazine has been started as further increases of lisinopril felt to be contraindicated in view of worsening kidney function  Blood glucoses have been stable on prior to admission Jardiance.      Patient offers no complaints however history limited by cognitive impairment.  In addition, patient appears upset, frustrated  "and generally refuses to answer questions or allow for much of an exam.  This morning he refused a postvoid bladder scan.  He states he is eating and drinking \"fine \"  Per chart review, he has been able to wheel himself around the facility.  He has had intermittent combative behaviors since admission          CODE STATUS/ADVANCE DIRECTIVES DISCUSSION:  Full Code  CPR/Full code   ALLERGIES: No Known Allergies   PAST MEDICAL HISTORY: As noted above  PAST SURGICAL HISTORY: History left lower extremity bypass graft  Left hip fracture repair March 2024  FAMILY HISTORY: family history is not on file.  SOCIAL HISTORY:     Patient's living condition: lives with spouse    Current medications were reviewed by me today      Current Outpatient Medications   Medication Sig Dispense Refill    acetaminophen (TYLENOL) 325 MG tablet Take 650 mg by mouth 3 times daily      amLODIPine (NORVASC) 10 MG tablet Take 10 mg by mouth daily      atorvastatin (LIPITOR) 40 MG tablet Take 40 mg by mouth daily      Calcium Carbonate Antacid 600 MG CHEW Take 1 tablet by mouth daily      empagliflozin (JARDIANCE) 10 MG TABS tablet Take 10 mg by mouth daily      gabapentin (NEURONTIN) 300 MG capsule Take 1 capsule (300 mg) by mouth at bedtime      hydrALAZINE (APRESOLINE) 10 MG tablet Take 1 tablet (10 mg) by mouth 3 times daily      Lidocaine (LIDOCARE) 4 % Patch Place 1 patch onto the skin every 24 hours To prevent lidocaine toxicity, patient should be patch free for 12 hrs daily.      lisinopril (ZESTRIL) 20 MG tablet Take 20 mg by mouth daily      metoprolol tartrate (LOPRESSOR) 25 MG tablet Take 100 mg by mouth 2 times daily      polyethylene glycol (MIRALAX) 17 g packet Take 17 g by mouth daily      QUEtiapine (SEROQUEL) 25 MG tablet Take 12.5 mg by mouth daily      QUEtiapine (SEROQUEL) 25 MG tablet Take 6.25 mg by mouth daily      sennosides (SENOKOT) 8.6 MG tablet Take 1 tablet by mouth 2 times daily      sodium bicarbonate 650 MG tablet " "Take 650 mg by mouth 2 times daily      tamsulosin (FLOMAX) 0.4 MG capsule Take 0.4 mg by mouth daily      warfarin ANTICOAGULANT (COUMADIN) 5 MG tablet Take by mouth daily Give as directed per Facility MR       No current facility-administered medications for this visit.       ROS:  Limited secondary to cognitive impairment but today pt reports no acute concerns    Vitals:  BP (!) 167/80   Pulse 73   Temp 98  F (36.7  C)   Resp 20   Ht 1.778 m (5' 10\")   Wt 84.5 kg (186 lb 4.8 oz)   SpO2 95%   BMI 26.73 kg/m    Exam:  Well-nourished appearing male, lying in bed.  He is sleepy, is alert, orientation status difficult to assess as patient is minimally cooperative with history or exam frequently stating \"what is the use.  Will you just leave me alone \"  HEENT: Oral mucosa moist  Face symmetric  Lungs clear  CV regular rhythm  Abdomen soft  Lower extremity exam not performed  Neuro assessment limited by patient's lack of cooperation.    Lab/Diagnostic data:  Most Recent 3 CBC's:  Recent Labs   Lab Test 07/08/24  0541   WBC 8.1   HGB 9.7*   MCV 91        Most Recent 3 BMP's:  Recent Labs   Lab Test 07/08/24  0541      POTASSIUM 4.1   CHLORIDE 110*   CO2 25   BUN 47.0*   CR 2.15*   ANIONGAP 8   MATHIEU 8.3*   *         ASSESSMENT/PLAN:      Syncope, hypotension, thought secondary to UTI  Patient has completed course of antibiotics  No current  symptoms  Plan: Monitor vitals    JOSE on chronic kidney disease  Etiology unclear but may be related to volume depletion in the setting of ACE inhibitor use.  Baseline creatinine appears to be around 1.7  Plan: Encourage increased oral intake.  Monitor blood pressure and BMP.  Encourage patient to allow a one-time postvoid bladder scan    UTI with positive urine culture for Enterococcus patient has completed treatment  Plan: Monitor for signs and symptoms of recurrent UTI.  Encourage patient to allow postvoid bladder scan    Hypertension  Poorly controlled " as noted above  Hydralazine added to regimen of lisinopril, amlodipine and metoprolol.  If renal function continues to worsen, may need to discontinue lisinopril and titrate more rapidly, hydralazine    History of cognitive impairment with worsening mental status in the setting of multiple hospitalizations, recurrent infections and different SNF placement  Intermittent agitation and refusal to allow treatments noted during hospitalization and since admission TCU  Plan: Therapies.  Continue quetiapine, monitor mental status    Diabetes mellitus type 2  Historically stable on empagliflozin  Plan: Monitor blood glucoses, renal function  If GFR less than 30, would need to discontinue empagliflozin    PAD, history of left leg bypass graft  Plan: Continue anticoagulation with warfarin.  Warfarin may also be indicated given history of A-fib though current rhythm currently regular    Anemia  Acute on chronic, likely secondary to acute and chronic illness, chronic kidney disease  Iron studies recently unremarkable  Status post transfusion 2 units packed red blood cells during recent hospitalization  Plan: Monitor hemoglobin      Christo Nieves MD

## 2024-07-10 NOTE — LETTER
7/10/2024      Cory Berg  [address] [address]        [facility] GERIATRICS    PRIMARY CARE PROVIDER AND CLINIC:  Physician No Ref-Primary, No address on file    Chief Complaint   Patient presents with     Hospital F/U      [place] Medical Record Number:  [id number]  Place of Service where encounter took place:  [facility] ([facility])     Cory Berg  is a 79 year old  (1945), admitted to the above facility from  [facility] . Hospital stay 6/26/24 through 7/2/24..     Patient is a past medical history of cognitive impairment, hypertension, PAD status post femoral bypass graft on warfarin, history of atrial fibrillation, history of thigh hematoma, chronic disease stage IIIa, depression.    He has had a complex recent medical history with multiple hospitalizations and TCU placements over the last few months.  This is well-documented in the nurse practitioner note.    He most recently was hospitalized for the evaluation of syncope with hypotension at his TCU.  He was found to have UTI.  He was found to have acute on chronic anemia, felt secondary to chronic disease.  Slums 17 out of 30 it was felt that he will ultimately discharge to long-term care.  He was noted to have waxing waning behavior concerns during hospitalization for which he was started on Seroquel with improvement.    Of note, since admission to the TCU, patient's creatinine has been noted to be significantly elevated with creatinine 2.15 baseline is in the mid 1 range.  He currently is receiving lisinopril 20 mg daily which is a chronic medication for him.  Blood pressures have been mildly elevated on current regimen of amlodipine, lisinopril, metoprolol  Hydralazine has been started as further increases of lisinopril felt to be contraindicated in view of worsening kidney function  Blood glucoses have been stable on prior to admission Jardiance.      Patient offers no complaints however history  "limited by cognitive impairment.  In addition, patient appears upset, frustrated and generally refuses to answer questions or allow for much of an exam.  This morning he refused a postvoid bladder scan.  He states he is eating and drinking \"fine \"  Per chart review, he has been able to wheel himself around the facility.  He has had intermittent combative behaviors since admission          CODE STATUS/ADVANCE DIRECTIVES DISCUSSION:  Full Code  CPR/Full code   ALLERGIES: No Known Allergies   PAST MEDICAL HISTORY: As noted above  PAST SURGICAL HISTORY: History left lower extremity bypass graft  Left hip fracture repair March 2024  FAMILY HISTORY: family history is not on file.  SOCIAL HISTORY:     Patient's living condition: lives with spouse    Current medications were reviewed by me today      Current Outpatient Medications   Medication Sig Dispense Refill     acetaminophen (TYLENOL) 325 MG tablet Take 650 mg by mouth 3 times daily       amLODIPine (NORVASC) 10 MG tablet Take 10 mg by mouth daily       atorvastatin (LIPITOR) 40 MG tablet Take 40 mg by mouth daily       Calcium Carbonate Antacid 600 MG CHEW Take 1 tablet by mouth daily       empagliflozin (JARDIANCE) 10 MG TABS tablet Take 10 mg by mouth daily       gabapentin (NEURONTIN) 300 MG capsule Take 1 capsule (300 mg) by mouth at bedtime       hydrALAZINE (APRESOLINE) 10 MG tablet Take 1 tablet (10 mg) by mouth 3 times daily       Lidocaine (LIDOCARE) 4 % Patch Place 1 patch onto the skin every 24 hours To prevent lidocaine toxicity, patient should be patch free for 12 hrs daily.       lisinopril (ZESTRIL) 20 MG tablet Take 20 mg by mouth daily       metoprolol tartrate (LOPRESSOR) 25 MG tablet Take 100 mg by mouth 2 times daily       polyethylene glycol (MIRALAX) 17 g packet Take 17 g by mouth daily       QUEtiapine (SEROQUEL) 25 MG tablet Take 12.5 mg by mouth daily       QUEtiapine (SEROQUEL) 25 MG tablet Take 6.25 mg by mouth daily       sennosides " "(SENOKOT) 8.6 MG tablet Take 1 tablet by mouth 2 times daily       sodium bicarbonate 650 MG tablet Take 650 mg by mouth 2 times daily       tamsulosin (FLOMAX) 0.4 MG capsule Take 0.4 mg by mouth daily       warfarin ANTICOAGULANT (COUMADIN) 5 MG tablet Take by mouth daily Give as directed per Facility MR       No current facility-administered medications for this visit.       ROS:  Limited secondary to cognitive impairment but today pt reports no acute concerns    Vitals:  BP (!) 167/80   Pulse 73   Temp 98  F (36.7  C)   Resp 20   Ht 1.778 m (5' 10\")   Wt 84.5 kg (186 lb 4.8 oz)   SpO2 95%   BMI 26.73 kg/m    Exam:  Well-nourished appearing male, lying in bed.  He is sleepy, is alert, orientation status difficult to assess as patient is minimally cooperative with history or exam frequently stating \"what is the use.  Will you just leave me alone \"  HEENT: Oral mucosa moist  Face symmetric  Lungs clear  CV regular rhythm  Abdomen soft  Lower extremity exam not performed  Neuro assessment limited by patient's lack of cooperation.    Lab/Diagnostic data:  Most Recent 3 CBC's:  Recent Labs   Lab Test 07/08/24  0541   WBC 8.1   HGB 9.7*   MCV 91        Most Recent 3 BMP's:  Recent Labs   Lab Test 07/08/24  0541      POTASSIUM 4.1   CHLORIDE 110*   CO2 25   BUN 47.0*   CR 2.15*   ANIONGAP 8   MATHIEU 8.3*   *         ASSESSMENT/PLAN:      Syncope, hypotension, thought secondary to UTI  Patient has completed course of antibiotics  No current  symptoms  Plan: Monitor vitals    JOSE on chronic kidney disease  Etiology unclear but may be related to volume depletion in the setting of ACE inhibitor use.  Baseline creatinine appears to be around 1.7  Plan: Encourage increased oral intake.  Monitor blood pressure and BMP.  Encourage patient to allow a one-time postvoid bladder scan    UTI with positive urine culture for Enterococcus patient has completed treatment  Plan: Monitor for signs and symptoms of " recurrent UTI.  Encourage patient to allow postvoid bladder scan    Hypertension  Poorly controlled as noted above  Hydralazine added to regimen of lisinopril, amlodipine and metoprolol.  If renal function continues to worsen, may need to discontinue lisinopril and titrate more rapidly, hydralazine    History of cognitive impairment with worsening mental status in the setting of multiple hospitalizations, recurrent infections and different SNF placement  Intermittent agitation and refusal to allow treatments noted during hospitalization and since admission TCU  Plan: Therapies.  Continue quetiapine, monitor mental status    Diabetes mellitus type 2  Historically stable on empagliflozin  Plan: Monitor blood glucoses, renal function  If GFR less than 30, would need to discontinue empagliflozin    PAD, history of left leg bypass graft  Plan: Continue anticoagulation with warfarin.  Warfarin may also be indicated given history of A-fib though current rhythm currently regular    Anemia  Acute on chronic, likely secondary to acute and chronic illness, chronic kidney disease  Iron studies recently unremarkable  Status post transfusion 2 units packed red blood cells during recent hospitalization  Plan: Monitor hemoglobin      Christo Nieves MD      Sincerely,        Christo Nieves MD

## 2024-07-11 NOTE — LETTER
" 7/11/2024      Cory Berg  [address] [facility] [address]        [facility] GERIATRICS    Chief Complaint   Patient presents with     RECHECK     HPI:  Cory Berg is a 79 year old  (1945), who is being seen today for an episodic care visit at: [facility] ([facility]) [[id number]]. Today's concern is:     [place] was visited today while in his wheelchair in the therapy gym. He has pain in the left leg which is chronic. Reviewed that creatinine was worsening. \"What do we do about it?\" States that he probably doesn't drink enough fluids. Eating has been fair. He has allowed for one PVR which was 137 ml. He doesn't want any further PVRs, \"knows\" that he is emptying his bladder fully with urination. Having regular bowel movements.     Allergies, and PMH/PSH reviewed in EPIC today.  REVIEW OF SYSTEMS:  4 point ROS including Respiratory, CV, GI and , other than that noted in the HPI,  is negative    Objective:   BP (!) 183/76   Pulse 60   Temp 98  F (36.7  C)   Resp 18   Ht 1.778 m (5' 10\")   Wt 84.4 kg (186 lb)   SpO2 96%   BMI 26.69 kg/m    GENERAL APPEARANCE:  Alert, in no distress, pleasant, cooperative  EYES: no discharge or mattering on lids or lashes noted  ENT:  moist mucous membranes, hearing acuity intact  NECK: supple, symmetrical  RESP: no respiratory distress, Lung sounds clear, patient is on room air  CV:  rate and rhythm regular, no murmur. Edema none in lower extremities.  VASCULAR: warm extremities without open areas.  ABDOMEN: normal bowel sounds, soft, nontender.  M/S:   Gait and station ambulates with walker, no tenderness or swelling of the joints; able to move all extremities   SKIN:  Inspection and palpation of skin and subcutaneous tissue: skin warm, dry and intact without rashes  NEURO: no facial asymmetry, no speech deficits and able to follow directions, moves all extremities symmetrically  PSYCH:  insight and judgement intact, memory impaired, affect and mood " normal    CBC RESULTS:   Recent Labs   Lab Test 07/08/24  0541   WBC 8.1   RBC 3.33*   HGB 9.7*   HCT 30.2*   MCV 91   MCH 29.1   MCHC 32.1   RDW 14.3          Last Basic Metabolic Panel:  Recent Labs   Lab Test 07/11/24  0555 07/08/24  0541    143   POTASSIUM 4.0 4.1   CHLORIDE 113* 110*   MATHIEU 8.1* 8.3*   CO2 25 25   BUN 50.3* 47.0*   CR 2.26* 2.15*   * 108*       Assessment/Plan:  Stage 4 Chronic kidney disease   Baseline creatinine around 1.6-1.7 and was increased up to 2.15 earlier this week and continues to worsen with creatinine up to 2.26.   --stop jardiance and lisinopril  --water 500 ml on each day and evening shift.  --Recheck BMP on 7/16  -- sodium bicarbonate BID    Depression  Dementia  SLUMS 17/30. Appears cognition has worsend since he was here in January 2024. Decline in cognitive scores likely due to infection and frequent hospital/TCU stays over the past 3-4 months.   --Continue with seroquel 6.25 mg at 4 pm, 12.5 mg in the evening, and 6.25 mg at bedtime daily   --will likely be moving to LTC after rehab.  -- If irritable behaviors continues with therapy or any other staff member will add a daily dose of Seroquel but will leave the doses as scheduled at this time as it was only 1 episode and he was redirectable.    Anemia  Acute on chronic. Had left thigh hematoma back in January then low hgb after hip fracture in March. HGB 6.9 upon this most recent hospital admission. Iron studies consistent with acute infection and chronic disease. Received 1 unit of PRBC and HGB back up yo 9.7 at hospital discharge and was 9.7 this week.   --follow clinically    Type 2 diabetes mellitus with stage 3a chronic kidney disease, without long-term current use of insulin (HR)  Diabetic peripheral neuropathy associated with type 2 diabetes mellitus (HR)  A1c of 6.5 on 11/13/23.   --discontinue jardiance now that GFR is below 30  --gabapentin for neuropathy  --change glucose monitoring once daily  alternating times.     Essential hypertension (HRC)  He currently has SBPs uncontrolled with readings up to 170s so hydralazine started yesterday. Will need to monitor BP closely now that lisinopril has been discontinued.   --Continue to monitor blood pressure and heart rate, adjust medications as needed.  -- amlodipine and metoprolol.   -- continue with hydralazine 10 mg TID, increase to 25 mg TID if SBP is elevated.     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 Surveillance of lower extremity U/S on 6/20 without significant change from November 2023. 50-75% stenosis of distal anastomosis of left LE bypass graft.   INR was 2.6 today.   --continue with coumadin 5 mg daily and recheck INR with labwork that is ordered for Monday.     Urinary tract infection  Recurrent. Urine culture grew Enterococcus, cefepime narrowed to amoxicillin which was completed on 7/2.      Physical deconditioning  Secondary to recent hospitalization and underlying medical conditions.   --ongoing PT/OT for strengthening.     MED REC REQUIRED  Post Medication Reconciliation Status: medication reconcilation previously completed during another office visit        Electronically signed by: RAY Mello CNP         Sincerely,        RAY Mello CNP

## 2024-07-11 NOTE — TELEPHONE ENCOUNTER
[facility]     Provider: RAY Garrison CNP   Facility: [facility]   Facility Type:  TCU    Caller: Madyson   Call Back Number: [phone]  Reason for call: INR  Diagnosis/Goal: PAD    Date INR New Dose/Orders   7/5 2.0 5mg every day    7/7 2.2 7.5mg    7/8 2.7 5mg every day    7/11 2.6         Heparin/Lovenox:  No  Currently on ABX?: No  Other interacting medication:  None  Missed or refused doses: No    Verbal Order/Direction given by Provider: NP wrote orders at the facility       Starla Pinto RN

## 2024-07-11 NOTE — PROGRESS NOTES
"[facility] GERIATRICS    Chief Complaint   Patient presents with    RECHECK     HPI:  Cory Berg is a 79 year old  (1945), who is being seen today for an [facility]odic [facility] visit at[facility] [[id number]]. Today's concern is:     [place] was visited today while in his wheelchair in the therapy gym. He has pain in the left leg which is chronic. Reviewed that creatinine was worsening. \"What do we do about it?\" States that he probably doesn't drink enough fluids. Eating has been fair. He has allowed for one PVR which was 137 ml. He doesn't want any further PVRs, \"knows\" that he is emptying his bladder fully with urination. Having regular bowel movements.     Allergies, and PMH/PSH reviewed in [facility] today.  REVIEW OF SYSTEMS:  4 point ROS including Respiratory, CV, GI and , other than that noted in the HPI,  is negative    Objective:   BP (!) 183/76   Pulse 60   Temp 98  F (36.7  C)   Resp 18   Ht 1.778 m (5' 10\")   Wt 84.4 kg (186 lb)   SpO2 96%   BMI 26.69 kg/m    GENERAL APPEARANCE:  Alert, in no distress, pleasant, cooperative  EYES: no discharge or mattering on lids or lashes noted  ENT:  moist mucous membranes, hearing acuity intact  NECK: supple, symmetrical  RESP: no respiratory distress, Lung sounds clear, patient is on room air  CV:  rate and rhythm regular, no murmur. Edema none in lower extremities.  VASCULAR: warm extremities without open areas.  ABDOMEN: normal bowel sounds, soft, nontender.  M/S:   Gait and station ambulates with walker, no tenderness or swelling of the joints; able to move all extremities   SKIN:  Inspection and palpation of skin and subcutaneous tissue: skin warm, dry and intact without rashes  NEURO: no facial asymmetry, no speech deficits and able to follow directions, moves all extremities symmetrically  PSYCH:  insight and judgement intact, memory impaired, affect and mood normal    CBC RESULTS:   Recent Labs   Lab Test 07/08/24  0541   WBC 8.1 "   RBC 3.33*   HGB 9.7*   HCT 30.2*   MCV 91   MCH 29.1   MCHC 32.1   RDW 14.3          Last Basic Metabolic Panel:  Recent Labs   Lab Test 07/11/24  0555 07/08/24  0541    143   POTASSIUM 4.0 4.1   CHLORIDE 113* 110*   MATHIEU 8.1* 8.3*   CO2 25 25   BUN 50.3* 47.0*   CR 2.26* 2.15*   * 108*       Assessment/Plan:  Stage 4 Chronic kidney disease   Baseline creatinine around 1.6-1.7 and was increased up to 2.15 earlier this week and continues to worsen with creatinine up to 2.26.   --stop jardiance and lisinopril  --water 500 ml on each day and evening shift.  --Recheck BMP on 7/16  -- sodium bicarbonate BID    Depression  Dementia  SLUMS 17/30. Appears cognition has worsend since he was here in January 2024. Decline in cognitive scores likely due to infection and frequent hospital/TCU stays over the past 3-4 months.   --Continue with seroquel 6.25 mg at 4 pm, 12.5 mg in the evening, and 6.25 mg at bedtime daily   --will likely be moving to LTC after rehab.  -- If irritable behaviors continues with therapy or any other staff member will add a daily dose of Seroquel but will leave the doses as scheduled at this time as it was only 1 episode and he was redirectable.    Anemia  Acute on chronic. Had left thigh hematoma back in January then low hgb after hip fracture in March. HGB 6.9 upon this most recent hospital admission. Iron studies consistent with acute infection and chronic disease. Received 1 unit of PRBC and HGB back up yo 9.7 at hospital discharge and was 9.7 this week.   --follow clinically    Type 2 diabetes mellitus with stage 3a chronic kidney disease, without long-term current use of insulin (HRC)  Diabetic peripheral neuropathy associated with type 2 diabetes mellitus (HR)  A1c of 6.5 on 11/13/23.   --discontinue jardiance now that GFR is below 30  --gabapentin for neuropathy  --change glucose monitoring once daily alternating times.     Essential hypertension (HRC)  He currently has  SBPs uncontrolled with readings up to 170s so hydralazine started yesterday. Will need to monitor BP closely now that lisinopril has been discontinued.   --Continue to monitor blood pressure and heart rate, adjust medications as needed.  -- amlodipine and metoprolol.   -- continue with hydralazine 10 mg TID, increase to 25 mg TID if SBP is elevated.     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 Surveillance of lower extremity U/S on 6/20 without significant change from November 2023. 50-75% stenosis of distal anastomosis of left LE bypass graft.   INR was 2.6 today.   --continue with coumadin 5 mg daily and recheck INR with labwork that is ordered for Monday.     Urinary tract infection  Recurrent. Urine culture grew Enterococcus, cefepime narrowed to amoxicillin which was completed on 7/2.      Physical deconditioning  Secondary to recent hospitalization and underlying medical conditions.   --ongoing PT/OT for strengthening.     MED REC REQUIRED  Post Medication Reconciliation Status: medication reconcilation previously completed during another office visit        Electronically signed by: RAY Mello CNP

## 2024-07-15 NOTE — LETTER
" 7/15/2024      Cory Berg  [address] [address]        [facility] GERIATRICS    Chief Complaint   Patient presents with     RECHECK     HPI:  Cory Berg is a 79 year old  (1945), who is being seen today for an episodic care visit at: [facility] ([facility]) [[id number]]. Today's concern is:     [place] was visited today while in his room sitting in the wheelchair.  He feels discouraged about his kidney function and becoming frustrated.  He feels that he has been drinking water.  Does not feel dry or dehydrated.  Feels that he is urinating well.  No complaints with bowel movements.  Per nursing he has been intermittently irritated with her cares.  Has not allowed for PVR monitoring    Allergies, and PMH/PSH reviewed in [facility] today.  REVIEW OF SYSTEMS:  4 point ROS including Respiratory, CV, GI and , other than that noted in the HPI,  is negative    Objective:   BP (!) 168/70   Pulse 73   Temp 98.2  F (36.8  C)   Resp 18   Ht 1.778 m (5' 10\")   Wt 84.2 kg (185 lb 11.2 oz)   SpO2 93%   BMI 26.65 kg/m    GENERAL APPEARANCE:  Alert, in no distress, pleasant, cooperative  EYES: no discharge or mattering on lids or lashes noted  ENT:  moist mucous membranes, hearing acuity intact  NECK: supple, symmetrical  RESP: no respiratory distress, Lung sounds clear, patient is on room air  CV:  rate and rhythm regular, no murmur. Edema none in lower extremities.  VASCULAR: warm extremities without open areas.  ABDOMEN: normal bowel sounds, soft, nontender.  M/S:   Gait and station ambulates with walker, no tenderness or swelling of the joints; able to move all extremities   SKIN:  Inspection and palpation of skin and subcutaneous tissue: skin warm, dry and intact without rashes  NEURO: no facial asymmetry, no speech deficits and able to follow directions, moves all extremities symmetrically  PSYCH:  insight and judgement intact, memory impaired, affect and mood normal    CBC RESULTS: "   Recent Labs   Lab Test 07/15/24  0630 07/08/24  0541   WBC  --  8.1   RBC  --  3.33*   HGB 8.9* 9.7*   HCT  --  30.2*   MCV  --  91   MCH  --  29.1   MCHC  --  32.1   RDW  --  14.3   PLT  --  183       Last Basic Metabolic Panel:  Recent Labs   Lab Test 07/17/24  0504 07/15/24  0630    147*   POTASSIUM 4.2 4.0   CHLORIDE 112* 116*   MATHIEU 8.2* 8.3*   CO2 23 22   BUN 40.3* 38.3*   CR 2.17* 2.15*   * 117*       Assessment/Plan:  Stage 4 Chronic kidney disease   Baseline creatinine around 1.6-1.7 and was increased up to 2.15 and 2.26 last week.  Jardiance and lisinopril were stopped.  Fluid was encouraged over the weekend and repeat creatinine without improvement of 2.17  --water 500 ml on each day and evening shift.  --Recheck BMP on 7/17  -- sodium bicarbonate BID    Depression  Dementia  SLUMS 17/30. Appears cognition has worsend since he was here in January 2024. Decline in cognitive scores likely due to infection and frequent hospital/TCU stays over the past 3-4 months.   --Continue with seroquel 6.25 mg at 4 pm, 12.5 mg in the evening, and 6.25 mg at bedtime daily   --will likely be moving to LTC after rehab.  -- If irritable behaviors continues with therapy or any other staff member will add a daily dose of Seroquel but will leave the doses as scheduled at this time as it was only 1 episode and he was redirectable.    Anemia  Acute on chronic. Had left thigh hematoma back in January then low hgb after hip fracture in March. HGB 6.9 upon this most recent hospital admission. Iron studies consistent with acute infection and chronic disease. Received 1 unit of PRBC and HGB back up yo 9.7 at hospital discharge and was 9.7 this week.   --follow clinically    Type 2 diabetes mellitus with stage 3a chronic kidney disease, without long-term current use of insulin (HRC)  Diabetic peripheral neuropathy associated with type 2 diabetes mellitus (HRC)  A1c of 6.5 on 11/13/23.  Jardiance was discontinued given his  GFR fell below 30.  Blood sugars have typically been below 200.  --gabapentin for neuropathy  --change glucose monitoring once daily alternating times.     Essential hypertension (HRC)  He currently has SBPs currently controlled in the 1 30-1 50s range which is appropriate given his age and comorbidities with falls risk.  --Continue to monitor blood pressure and heart rate, adjust medications as needed.  -- amlodipine and metoprolol.   -- continue with hydralazine 10 mg TID, increase to 25 mg TID if SBP is elevated.     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 Surveillance of lower extremity U/S on 6/20 without significant change from November 2023. 50-75% stenosis of distal anastomosis of left LE bypass graft.   INR was 2.6 today.   --continue with coumadin as scheduled.     Urinary tract infection  Recurrent. Urine culture grew Enterococcus, cefepime narrowed to amoxicillin which was completed on 7/2.  No further action.  He has not allowed for PVRs     Physical deconditioning  Secondary to recent hospitalization and underlying medical conditions.   --ongoing PT/OT for strengthening.     MED REC REQUIRED  Post Medication Reconciliation Status: medication reconcilation previously completed during another office visit        Electronically signed by: RAY Mello CNP           Sincerely,        RAY Mello CNP

## 2024-07-15 NOTE — PROGRESS NOTES
"[facility] GERIATRICS    Chief Complaint   Patient presents with    RECHECK     HPI:  Cory Berg is a 79 year old  (1945), who is being seen today for an episodic care visit at: [facility] ([facility]) [[id number]]. Today's concern is:     [place] was visited today while in his room sitting in the wheelchair.  He feels discouraged about his kidney function and becoming frustrated.  He feels that he has been drinking water.  Does not feel dry or dehydrated.  Feels that he is urinating well.  No complaints with bowel movements.  Per nursing he has been intermittently irritated with her cares.  Has not allowed for PVR monitoring    Allergies, and PMH/PSH reviewed in EPIC today.  REVIEW OF SYSTEMS:  4 point ROS including Respiratory, CV, GI and , other than that noted in the HPI,  is negative    Objective:   BP (!) 168/70   Pulse 73   Temp 98.2  F (36.8  C)   Resp 18   Ht 1.778 m (5' 10\")   Wt 84.2 kg (185 lb 11.2 oz)   SpO2 93%   BMI 26.65 kg/m    GENERAL APPEARANCE:  Alert, in no distress, pleasant, cooperative  EYES: no discharge or mattering on lids or lashes noted  ENT:  moist mucous membranes, hearing acuity intact  NECK: supple, symmetrical  RESP: no respiratory distress, Lung sounds clear, patient is on room air  CV:  rate and rhythm regular, no murmur. Edema none in lower extremities.  VASCULAR: warm extremities without open areas.  ABDOMEN: normal bowel sounds, soft, nontender.  M/S:   Gait and station ambulates with walker, no tenderness or swelling of the joints; able to move all extremities   SKIN:  Inspection and palpation of skin and subcutaneous tissue: skin warm, dry and intact without rashes  NEURO: no facial asymmetry, no speech deficits and able to follow directions, moves all extremities symmetrically  PSYCH:  insight and judgement intact, memory impaired, affect and mood normal    CBC RESULTS:   Recent Labs   Lab Test 07/15/24  0630 07/08/24  0541   WBC  --  8.1   RBC  --  " 3.33*   HGB 8.9* 9.7*   HCT  --  30.2*   MCV  --  91   MCH  --  29.1   MCHC  --  32.1   RDW  --  14.3   PLT  --  183       Last Basic Metabolic Panel:  Recent Labs   Lab Test 07/17/24  0504 07/15/24  0630    147*   POTASSIUM 4.2 4.0   CHLORIDE 112* 116*   MATHIEU 8.2* 8.3*   CO2 23 22   BUN 40.3* 38.3*   CR 2.17* 2.15*   * 117*       Assessment/Plan:  Stage 4 Chronic kidney disease   Baseline creatinine around 1.6-1.7 and was increased up to 2.15 and 2.26 last week.  Jardiance and lisinopril were stopped.  Fluid was encouraged over the weekend and repeat creatinine without improvement of 2.17  --water 500 ml on each day and evening shift.  --Recheck BMP on 7/17  -- sodium bicarbonate BID    Depression  Dementia  SLUMS 17/30. Appears cognition has worsend since he was here in January 2024. Decline in cognitive scores likely due to infection and frequent hospital/TCU stays over the past 3-4 months.   --Continue with seroquel 6.25 mg at 4 pm, 12.5 mg in the evening, and 6.25 mg at bedtime daily   --will likely be moving to LTC after rehab.  -- If irritable behaviors continues with therapy or any other staff member will add a daily dose of Seroquel but will leave the doses as scheduled at this time as it was only 1 episode and he was redirectable.    Anemia  Acute on chronic. Had left thigh hematoma back in January then low hgb after hip fracture in March. HGB 6.9 upon this most recent hospital admission. Iron studies consistent with acute infection and chronic disease. Received 1 unit of PRBC and HGB back up yo 9.7 at hospital discharge and was 9.7 this week.   --follow clinically    Type 2 diabetes mellitus with stage 3a chronic kidney disease, without long-term current use of insulin (HRC)  Diabetic peripheral neuropathy associated with type 2 diabetes mellitus (HRC)  A1c of 6.5 on 11/13/23.  Jardiance was discontinued given his GFR fell below 30.  Blood sugars have typically been below 200.  --gabapentin  for neuropathy  --change glucose monitoring once daily alternating times.     Essential hypertension (HRC)  He currently has SBPs currently controlled in the 1 30-1 50s range which is appropriate given his age and comorbidities with falls risk.  --Continue to monitor blood pressure and heart rate, adjust medications as needed.  -- amlodipine and metoprolol.   -- continue with hydralazine 10 mg TID, increase to 25 mg TID if SBP is elevated.     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 Surveillance of lower extremity U/S on 6/20 without significant change from November 2023. 50-75% stenosis of distal anastomosis of left LE bypass graft.   INR was 2.6 today.   --continue with coumadin as scheduled.     Urinary tract infection  Recurrent. Urine culture grew Enterococcus, cefepime narrowed to amoxicillin which was completed on 7/2.  No further action.  He has not allowed for PVRs     Physical deconditioning  Secondary to recent hospitalization and underlying medical conditions.   --ongoing PT/OT for strengthening.     MED REC REQUIRED  Post Medication Reconciliation Status: medication reconcilation previously completed during another office visit        Electronically signed by: RAY Mello CNP

## 2024-07-19 NOTE — TELEPHONE ENCOUNTER
[facility] Geriatrics Triage Nurse INR     Provider: RAY Garrison CNP   Facility: [facility]   Facility Type:  TCU    Caller: Herminia   Call Back Number: [phone]  Reason for call: INR  Diagnosis/Goal: PAD  Date INR New Dose/Orders   7/5 2.0 5mg every day    7/7 2.2 7.5mg    7/8 2.7 5mg every day    7/11 2.6  5mg every day    7/15 2.7 5mg every day    7/19 2.0           Heparin/Lovenox:  No  Currently on ABX?: No  Other interacting medication:  None  Missed or refused doses: No    Verbal Order/Direction given by Provider:    6 mg M/F and 5 mg AOD with recheck in a week    Provider Giving Order:  RAY Garrison CNP     Verbal Order given to: Herminia Pinto RN

## 2024-07-22 NOTE — PROGRESS NOTES
"[facility] GERIATRICS    Chief Complaint   Patient presents with    RECHECK     HPI:  Cory Berg is a 79 year old  (1945), who is being seen today for an episodic care visit at: [facility] ([facility]) [[id number]]. Today's concern is:     [place] was visited today while in his room sitting on the edge of the bed.  Staff is assisting him to get into the wheelchair so he can wheel down to get popcorn which she is excited about.  He has been eating well.  Feels that he had probably has not been drinking enough fluid.  Has ongoing pain in his left leg.    Allergies, and PMH/PSH reviewed in EPIC today.  REVIEW OF SYSTEMS:  4 point ROS including Respiratory, CV, GI and , other than that noted in the HPI,  is negative    Objective:   BP (!) 152/75   Pulse 72   Temp 97.5  F (36.4  C)   Resp 18   Ht 1.778 m (5' 10\")   Wt 84.4 kg (186 lb)   SpO2 95%   BMI 26.69 kg/m    GENERAL APPEARANCE:  Alert, in no distress, pleasant, cooperative  EYES: no discharge or mattering on lids or lashes noted  ENT:  moist mucous membranes, hearing acuity intact  NECK: supple, symmetrical  RESP: no respiratory distress, Lung sounds clear, patient is on room air  CV:  rate and rhythm regular, no murmur. Edema none in lower extremities.  VASCULAR: warm extremities without open areas.  ABDOMEN: normal bowel sounds, soft, nontender.  M/S:   Gait and station ambulates with walker, no tenderness or swelling of the joints; able to move all extremities   SKIN:  Inspection and palpation of skin and subcutaneous tissue: skin warm, dry and intact without rashes  NEURO: no facial asymmetry, no speech deficits and able to follow directions, moves all extremities symmetrically  PSYCH:  insight and judgement intact, memory impaired, affect and mood normal    CBC RESULTS:   Recent Labs   Lab Test 07/15/24  0630 07/08/24  0541   WBC  --  8.1   RBC  --  3.33*   HGB 8.9* 9.7*   HCT  --  30.2*   MCV  --  91   MCH  --  29.1   MCHC  --  32.1 "   RDW  --  14.3   PLT  --  183       Last Basic Metabolic Panel:  Recent Labs   Lab Test 07/17/24  0504 07/15/24  0630    147*   POTASSIUM 4.2 4.0   CHLORIDE 112* 116*   MATHIEU 8.2* 8.3*   CO2 23 22   BUN 40.3* 38.3*   CR 2.17* 2.15*   * 117*     Assessment/Plan:  Stage 4 Chronic kidney disease   Baseline creatinine around 1.6-1.7 and was increased up to 2.15 and 2.26 last week.  Jardiance and lisinopril were stopped.  Fluid was encouraged creatinine without improvement of 2.17.   --water 500 ml on each day and evening shift.  --Recheck BMP this week.   -- sodium bicarbonate BID    Depression  Dementia  SLUMS 17/30. Appears cognition has worsend since he was here in January 2024. Decline in cognitive scores likely due to infection and frequent hospital/TCU stays over the past 3-4 months.   --Continue with seroquel 6.25 mg at 4 pm, 12.5 mg in the evening, and 6.25 mg at bedtime daily   --will likely be moving to LTC after rehab.  -- If irritable behaviors continues with therapy or any other staff member will add a daily dose of Seroquel but will leave the doses as scheduled at this time as it was only 1 episode and he was redirectable.    Anemia  Acute on chronic. Had left thigh hematoma back in January then low hgb after hip fracture in March. HGB 6.9 upon this most recent hospital admission. Iron studies consistent with acute infection and chronic disease. Received 1 unit of PRBC and HGB back up yo 9.7 at hospital discharge and was 9.7 at TCU  --follow clinically    Type 2 diabetes mellitus with stage 3a chronic kidney disease, without long-term current use of insulin (HRC)  Diabetic peripheral neuropathy associated with type 2 diabetes mellitus (HR)  A1c of 6.5 on 11/13/23.  Jardiance was discontinued given his GFR fell below 30.  Blood sugars have typically been below 200.  --gabapentin for neuropathy  --change glucose monitoring once daily alternating times.     Essential hypertension (HRC)  He  currently has SBPs currently controlled in the 130-1550s range which is appropriate given his age and comorbidities with falls risk.  --Continue to monitor blood pressure and heart rate, adjust medications as needed.  -- amlodipine and metoprolol.   -- continue with hydralazine 10 mg TID, increase to 25 mg TID if SBP is elevated.     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 Surveillance of lower extremity U/S on 6/20 without significant change from November 2023. 50-75% stenosis of distal anastomosis of left LE bypass graft.   INR has been stable in the mid 2's.   --continue with coumadin as scheduled.     Urinary tract infection  Recurrent. Urine culture grew Enterococcus, cefepime narrowed to amoxicillin which was completed on 7/2.  No further action.  He has not allowed for PVRs     Physical deconditioning  Secondary to recent hospitalization and underlying medical conditions.   --ongoing PT/OT for strengthening.     MED REC REQUIRED  Post Medication Reconciliation Status: medication reconcilation previously completed during another office visit      Electronically signed by: RAY Mello CNP

## 2024-07-22 NOTE — LETTER
" 7/22/2024      Cory Berg  [address] [address]        [facility] GERIATRICS    Chief Complaint   Patient presents with     RECHECK     HPI:  Cory Berg is a 79 year old  (1945), who is being seen today for an episodic care visit at: [facility] ([facility]) [[id number]]. Today's concern is:     [place] was visited today while in his room sitting on the edge of the bed.  Staff is assisting him to get into the wheelchair so he can wheel down to get popcorn which she is excited about.  He has been eating well.  Feels that he had probably has not been drinking enough fluid.  Has ongoing pain in his left leg.    Allergies, and PMH/PSH reviewed in EPIC today.  REVIEW OF SYSTEMS:  4 point ROS including Respiratory, CV, GI and , other than that noted in the HPI,  is negative    Objective:   BP (!) 152/75   Pulse 72   Temp 97.5  F (36.4  C)   Resp 18   Ht 1.778 m (5' 10\")   Wt 84.4 kg (186 lb)   SpO2 95%   BMI 26.69 kg/m    GENERAL APPEARANCE:  Alert, in no distress, pleasant, cooperative  EYES: no discharge or mattering on lids or lashes noted  ENT:  moist mucous membranes, hearing acuity intact  NECK: supple, symmetrical  RESP: no respiratory distress, Lung sounds clear, patient is on room air  CV:  rate and rhythm regular, no murmur. Edema none in lower extremities.  VASCULAR: warm extremities without open areas.  ABDOMEN: normal bowel sounds, soft, nontender.  M/S:   Gait and station ambulates with walker, no tenderness or swelling of the joints; able to move all extremities   SKIN:  Inspection and palpation of skin and subcutaneous tissue: skin warm, dry and intact without rashes  NEURO: no facial asymmetry, no speech deficits and able to follow directions, moves all extremities symmetrically  PSYCH:  insight and judgement intact, memory impaired, affect and mood normal    CBC RESULTS:   Recent Labs   Lab Test 07/15/24  0630 07/08/24  0541   WBC  --  8.1   RBC  --  3.33* "   HGB 8.9* 9.7*   HCT  --  30.2*   MCV  --  91   MCH  --  29.1   MCHC  --  32.1   RDW  --  14.3   PLT  --  183       Last Basic Metabolic Panel:  Recent Labs   Lab Test 07/17/24  0504 07/15/24  0630    147*   POTASSIUM 4.2 4.0   CHLORIDE 112* 116*   MATHIEU 8.2* 8.3*   CO2 23 22   BUN 40.3* 38.3*   CR 2.17* 2.15*   * 117*     Assessment/Plan:  Stage 4 Chronic kidney disease   Baseline creatinine around 1.6-1.7 and was increased up to 2.15 and 2.26 last week.  Jardiance and lisinopril were stopped.  Fluid was encouraged creatinine without improvement of 2.17.   --water 500 ml on each day and evening shift.  --Recheck BMP this week.   -- sodium bicarbonate BID    Depression  Dementia  [facility] 17/30. Appears cognition has worsend since he was here in January 2024. Decline in cognitive scores likely due to infection and frequent hospital/TCU stays over the past 3-4 months.   --Continue with seroquel 6.25 mg at 4 pm, 12.5 mg in the evening, and 6.25 mg at bedtime daily   --will likely be moving to LTC after rehab.  -- If irritable behaviors continues with therapy or any other staff member will add a daily dose of Seroquel but will leave the doses as scheduled at this time as it was only 1 episode and he was redirectable.    Anemia  Acute on chronic. Had left thigh hematoma back in January then low hgb after hip fracture in March. HGB 6.9 upon this most recent hospital admission. Iron studies consistent with acute infection and chronic disease. Received 1 unit of PRBC and HGB back up yo 9.7 at hospital discharge and was 9.7 at TCU  --follow clinically    Type 2 diabetes mellitus with stage 3a chronic kidney disease, without long-term current use of insulin (HRC)  Diabetic peripheral neuropathy associated with type 2 diabetes mellitus (HRC)  A1c of 6.5 on 11/13/23.  Jardiance was discontinued given his GFR fell below 30.  Blood sugars have typically been below 200.  --gabapentin for neuropathy  --change glucose  monitoring once daily alternating times.     Essential hypertension (HRC)  He currently has SBPs currently controlled in the 130-1550s range which is appropriate given his age and comorbidities with falls risk.  --Continue to monitor blood pressure and heart rate, adjust medications as needed.  -- amlodipine and metoprolol.   -- continue with hydralazine 10 mg TID, increase to 25 mg TID if SBP is elevated.     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 Surveillance of lower extremity U/S on 6/20 without significant change from November 2023. 50-75% stenosis of distal anastomosis of left LE bypass graft.   INR has been stable in the mid 2's.   --continue with coumadin as scheduled.     Urinary tract infection  Recurrent. Urine culture grew Enterococcus, cefepime narrowed to amoxicillin which was completed on 7/2.  No further action.  He has not allowed for PVRs     Physical deconditioning  Secondary to recent hospitalization and underlying medical conditions.   --ongoing PT/OT for strengthening.     MED REC REQUIRED  Post Medication Reconciliation Status: medication reconcilation previously completed during another office visit      Electronically signed by: RAY Mello CNP             Sincerely,        RAY Mello CNP

## 2024-07-26 NOTE — TELEPHONE ENCOUNTER
[facility] Geriatrics Triage Nurse INR     Provider: RAY Garrison CNP   Facility: [facility]   Facility Type:  TCU    Caller: Laura  Call Back Number: [phone]  Reason for call: INR  Diagnosis/Goal: PAD    Date INR New Dose/Orders   7/11 2.6 5 mg daily   7/15 2.7 5 mg daily   7/19 2.0 6 mg Mon/Fri and 5 mg AOD   7/26 2.8 Cont. same        Heparin/Lovenox:  No  Currently on ABX?: No  Other interacting medication:  None  Missed or refused doses: No    Verbal Order/Direction given by Provider: Continue same Coumadin dose of 6 mg po on Mon/Fri and 5 mg po AOD.  Recheck INR on 8/2.  Also check BMP on 7/29.    Provider Giving Order:  RAY Garrison CNP     Verbal Order given to: Laura Lassiter RN

## 2024-07-29 NOTE — PROGRESS NOTES
"[facility] GERIATRICS    Chief Complaint   Patient presents with    RECHECK     HPI:  Cory Berg is a 79 year old  (1945), who is being seen today for an episodic care visit at: [facility] ([facility]) [[id number]]. Today's concern is:     [place] was visited today while in his room watching the Olympics on television.  He has ongoing pain in the left leg which is not new for him.  He is slightly irritable today and asks to get out of bed.  No complaints of shortness of breath.  Staff does not have concerns    Allergies, and PMH/PSH reviewed in EPIC today.  REVIEW OF SYSTEMS:  4 point ROS including Respiratory, CV, GI and , other than that noted in the HPI,  is negative    Objective:   BP (!) 173/70   Pulse 60   Temp 98  F (36.7  C)   Resp 18   Ht 1.778 m (5' 10\")   Wt 84.4 kg (186 lb)   SpO2 96%   BMI 26.69 kg/m    GENERAL APPEARANCE:  Alert, in no distress, pleasant, cooperative  EYES: no discharge or mattering on lids or lashes noted  ENT:  moist mucous membranes, hearing acuity intact  NECK: supple, symmetrical  RESP: no respiratory distress, Lung sounds clear, patient is on room air  CV:  rate and rhythm regular, no murmur. Edema LLE with 2+.  VASCULAR: warm extremities without open areas.  ABDOMEN: normal bowel sounds, soft, nontender.  M/S:   Gait and station ambulates with walker, no tenderness or swelling of the joints; able to move all extremities   SKIN:  Inspection and palpation of skin and subcutaneous tissue: skin warm, dry and intact without rashes  NEURO: no facial asymmetry, no speech deficits and able to follow directions, moves all extremities symmetrically  PSYCH:  insight and judgement intact, memory impaired, affect and mood normal    CBC RESULTS:   Recent Labs   Lab Test 07/15/24  0630 07/08/24  0541   WBC  --  8.1   RBC  --  3.33*   HGB 8.9* 9.7*   HCT  --  30.2*   MCV  --  91   MCH  --  29.1   MCHC  --  32.1   RDW  --  14.3   PLT  --  183       Last Basic Metabolic " Panel:  Recent Labs   Lab Test 07/29/24  0527 07/17/24  0504    142   POTASSIUM 4.4 4.2   CHLORIDE 112* 112*   MATHIEU 7.8* 8.2*   CO2 26 23   BUN 46.0* 40.3*   CR 2.32* 2.17*   * 117*     Assessment/Plan:  Stage 4 Chronic kidney disease   Baseline creatinine around 1.6-1.7 and was increased up to 2.15 and 2.26 back to 2.17 and now today 2.32. Jardiance and lisinopril were stopped.   --water 500 ml on each day and evening shift.  --Recheck BMP on 8/1  -- sodium bicarbonate BID  --will obtain a UA and bilateral renal ultrasound to ensure there is no anatomical etiology.     Depression  Dementia  SLU[facility] 17/30. Appears cognition has worsend since he was here in January 2024. Decline in cognitive scores likely due to infection and frequent hospital/TCU stays over the past 3-4 months.   --Continue with seroquel 6.25 mg at 4 pm, 12.5 mg in the evening, and 6.25 mg at bedtime daily   --will be moving to LTC here at [facility].   -- If irritable behaviors continues with therapy or any other staff member will add a daily dose of Seroquel but will leave the doses as scheduled at this time as it was only 1 episode and he was redirectable.    Anemia  Acute on chronic. Had left thigh hematoma back in January then low hgb after hip fracture in March. HGB 6.9 upon this most recent hospital admission. Iron studies consistent with acute infection and chronic disease. Received 1 unit of PRBC and HGB back up yo 9.7 at hospital discharge and was 9.7 at TCU  --follow clinically    Type 2 diabetes mellitus with stage 3a chronic kidney disease, without long-term current use of insulin (HRC)  Diabetic peripheral neuropathy associated with type 2 diabetes mellitus (HR)  A1c of 6.5 on 11/13/23.  Jardiance was discontinued given his GFR fell below 30.  Blood sugars have typically been below 200.  --gabapentin for neuropathy  --change glucose monitoring once daily alternating times.     Essential hypertension (HRC)  He currently has SBPs  currently  uncontrolled with SBPs in the 160-170s range  --Continue to monitor blood pressure and heart rate, adjust medications as needed.  -- amlodipine and metoprolol.   -- continue with hydralazine but increase to 25 mg TID.     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 Surveillance of lower extremity U/S on 6/20 without significant change from November 2023. 50-75% stenosis of distal anastomosis of left LE bypass graft.   INR has been stable in the mid 2's.   --continue with coumadin as scheduled.  --has increased LLE edema, lymph to eval and treat.     Urinary tract infection  Recurrent. Urine culture grew Enterococcus, cefepime narrowed to amoxicillin which was completed on 7/2.  No further action.  He has not allowed for PVRs.  Given the rise in creatinine will obtain a UA to ensure there is nothing abnormal causing this.     Physical deconditioning  Secondary to recent hospitalization and underlying medical conditions.   --ongoing PT/OT for strengthening.     MED REC REQUIRED  Post Medication Reconciliation Status: medication reconcilation previously completed during another office visit      Electronically signed by: RAY Mello CNP

## 2024-07-29 NOTE — LETTER
" 7/29/2024      Cory Berg  [address] [address]        [facility] GERIATRICS    Chief Complaint   Patient presents with     RECHECK     HPI:  Cory Berg is a 79 year old  (1945), who is being seen today for an episodic care visit at: [facility] ([facility]) [[id number]]. Today's concern is:     [place] was visited today while in his room watching the Olympics on television.  He has ongoing pain in the left leg which is not new for him.  He is slightly irritable today and asks to get out of bed.  No complaints of shortness of breath.  Staff does not have concerns    Allergies, and PMH/PSH reviewed in EPIC today.  REVIEW OF SYSTEMS:  4 point ROS including Respiratory, CV, GI and , other than that noted in the HPI,  is negative    Objective:   BP (!) 173/70   Pulse 60   Temp 98  F (36.7  C)   Resp 18   Ht 1.778 m (5' 10\")   Wt 84.4 kg (186 lb)   SpO2 96%   BMI 26.69 kg/m    GENERAL APPEARANCE:  Alert, in no distress, pleasant, cooperative  EYES: no discharge or mattering on lids or lashes noted  ENT:  moist mucous membranes, hearing acuity intact  NECK: supple, symmetrical  RESP: no respiratory distress, Lung sounds clear, patient is on room air  CV:  rate and rhythm regular, no murmur. Edema LLE with 2+.  VASCULAR: warm extremities without open areas.  ABDOMEN: normal bowel sounds, soft, nontender.  M/S:   Gait and station ambulates with walker, no tenderness or swelling of the joints; able to move all extremities   SKIN:  Inspection and palpation of skin and subcutaneous tissue: skin warm, dry and intact without rashes  NEURO: no facial asymmetry, no speech deficits and able to follow directions, moves all extremities symmetrically  PSYCH:  insight and judgement intact, memory impaired, affect and mood normal    CBC RESULTS:   Recent Labs   Lab Test 07/15/24  0630 07/08/24  0541   WBC  --  8.1   RBC  --  3.33*   HGB 8.9* 9.7*   HCT  --  30.2*   MCV  --  91   MCH  --  " 29.1   MCHC  --  32.1   RDW  --  14.3   PLT  --  183       Last Basic Metabolic Panel:  Recent Labs   Lab Test 07/29/24  0527 07/17/24  0504    142   POTASSIUM 4.4 4.2   CHLORIDE 112* 112*   MATHIEU 7.8* 8.2*   CO2 26 23   BUN 46.0* 40.3*   CR 2.32* 2.17*   * 117*     Assessment/Plan:  Stage 4 Chronic kidney disease   Baseline creatinine around 1.6-1.7 and was increased up to 2.15 and 2.26 back to 2.17 and now today 2.32. Jardiance and lisinopril were stopped.   --water 500 ml on each day and evening shift.  --Recheck BMP on 8/1  -- sodium bicarbonate BID  --will obtain a UA and bilateral renal ultrasound to ensure there is no anatomical etiology.     Depression  Dementia  [facility] 17/30. Appears cognition has worsend since he was here in January 2024. Decline in cognitive scores likely due to infection and frequent hospital/TCU stays over the past 3-4 months.   --Continue with seroquel 6.25 mg at 4 pm, 12.5 mg in the evening, and 6.25 mg at bedtime daily   --will be moving to LTC here at [facility].   -- If irritable behaviors continues with therapy or any other staff member will add a daily dose of Seroquel but will leave the doses as scheduled at this time as it was only 1 episode and he was redirectable.    Anemia  Acute on chronic. Had left thigh hematoma back in January then low hgb after hip fracture in March. HGB 6.9 upon this most recent hospital admission. Iron studies consistent with acute infection and chronic disease. Received 1 unit of PRBC and HGB back up yo 9.7 at hospital discharge and was 9.7 at TCU  --follow clinically    Type 2 diabetes mellitus with stage 3a chronic kidney disease, without long-term current use of insulin ([facility])  Diabetic peripheral neuropathy associated with type 2 diabetes mellitus ([facility])  A1c of 6.5 on 11/13/23.  Jardiance was discontinued given his GFR fell below 30.  Blood sugars have typically been below 200.  --gabapentin for neuropathy  --change glucose monitoring once  daily alternating times.     Essential hypertension (HRC)  He currently has SBPs currently  uncontrolled with SBPs in the 160-170s range  --Continue to monitor blood pressure and heart rate, adjust medications as needed.  -- amlodipine and metoprolol.   -- continue with hydralazine but increase to 25 mg TID.     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 Surveillance of lower extremity U/S on 6/20 without significant change from November 2023. 50-75% stenosis of distal anastomosis of left LE bypass graft.   INR has been stable in the mid 2's.   --continue with coumadin as scheduled.  --has increased LLE edema, lymph to eval and treat.     Urinary tract infection  Recurrent. Urine culture grew Enterococcus, cefepime narrowed to amoxicillin which was completed on 7/2.  No further action.  He has not allowed for PVRs.  Given the rise in creatinine will obtain a UA to ensure there is nothing abnormal causing this.     Physical deconditioning  Secondary to recent hospitalization and underlying medical conditions.   --ongoing PT/OT for strengthening.     MED REC REQUIRED  Post Medication Reconciliation Status: medication reconcilation previously completed during another office visit      Electronically signed by: RAY Mello CNP                 Sincerely,        RAY Mello CNP

## 2024-07-31 NOTE — PROGRESS NOTES
"[facility] GERIATRICS    Chief Complaint   Patient presents with    RECHECK     HPI:  Cory Berg is a 79 year old  (1945), who is being seen today for an episodic care visit at: [facility] ([facility])     Today's concern is: Elevated creatinine  Course reviewed with nurse practitioner  Creatinine has remained elevated in the low 2 range.  Baseline is 1.6-1.7  Lisinopril and Jardiance have been discontinued.  Patient receiving sodium bicarbonate.  Intake has been fair per patient.  Patient spends most of his time in his room in bed though he is wheeled down stairs on occasion.  Blood glucoses and blood pressures have been stable    He offers a limited history today.  He answers most questions with \"I do not know, I do not care .\"  He does however ask appropriate questions regarding his kidney status and evaluation today.    Allergies, and PMH/PSH reviewed in EPIC today.   Latest Reference Range & Units 07/08/24 05:41 07/11/24 05:55 07/15/24 06:30 07/17/24 05:04 07/29/24 05:27   Creatinine 0.67 - 1.17 mg/dL 2.15 (H) 2.26 (H) 2.15 (H) 2.17 (H) 2.32 (H)     Hgb  8.9 on 7/15/24  Renal US no gross abnormality      Objective:   BP (!) 165/69   Pulse 76   Temp 98.6  F (37  C)   Resp 19   Ht 1.778 m (5' 10\")   Wt 84.4 kg (186 lb)   SpO2 96%   BMI 26.69 kg/m      Lying in bed, in no distress  Alert, oriented to self and surroundings  Lungs clear  Oral mucosa moist  CV RRR  Abdomen soft  Extremities: 2+ pedal and left calf edema      Assessment/Plan:      Chronic kidney disease stage IV with recent increase above baseline in the setting of recent UTI with sepsis.  No clear reversible process.  Patient has not cooperated for postvoid bladder scan however no significant bladder distention was noted on recent renal ultrasound  Lisinopril has been discontinued.  Oral intake has been fair  Plan: Continue to monitor BMP.  Nephrology follow-up    Diabetes mellitus type 2  Stable off Jardiance  Plan: " Routine blood glucose monitoring.  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  Iron studies reportedly consistent with chronic disease  Plan: Monitor CBC    Recent UTI with Enterococcus  Status post treatment with cefepime followed by amoxicillin  As above, patient has not permitted postvoid bladder residuals assessment    History of PAD status post bypass graft left lower extremity, stable per arterial Doppler study June 2024  Chronic left lower extremity edema  Negative venous Doppler left lower extremity March 2024  Plan: Chronic warfarin    Hypertension  Fair control on metoprolol, amlodipine and hydralazine.  Lisinopril discontinued as above  Plan: Monitor blood pressure, BMP    Dementia  Depression  Slums 17/30  Occasional irritable behaviors with refusal of cares  Plan: Continue low-dose Seroquel  Monitor behaviors  Patient will be moving to long-term care      Christo Nieves MD

## 2024-07-31 NOTE — TELEPHONE ENCOUNTER
[facility] Geriatrics Lab Note     Provider: RAY Garrison CNP   Facility: [facility]  Facility Type:  TCU    No Known Allergies    Labs Reviewed by provider: Renal US         Verbal Order/Direction given by Provider: NNO    Provider giving Order:  Christo Nieves MD    Verbal Order given to: Bridgette Sotelo RN

## 2024-08-02 NOTE — TELEPHONE ENCOUNTER
[facility] Geriatrics Triage Nurse INR     Provider: RAY Garrison CNP   Facility: [facility]   Facility Type:  TCU    Caller: Herminia  Call Back Number: [phone]  Reason for call: INR  Diagnosis/Goal: PAD    Date INR New Dose/Orders   7/15 2.7 5 mg daily   7/19 2.0 6 mg M/F and 5 mg AOD   7/26 2.5 6 mg M/F and 5 mg AOD   8/2/24 2.6 6 mg M/F and 5 mg AOD        Heparin/Lovenox:  No  Currently on ABX?: No  Other interacting medication:  None  Missed or refused doses: No    *Nursing also reporting 3+ pitting edema to LLE from ankle to above knee. Painful lump above knee that is warm to the touch. No redness. Vitals: 125/67, 72, 97.6, 98%RA. Pt refuses to wear compression.    Verbal Order/Direction given by Provider: Coumadin 6 mg PO M/F and 5 mg PO AOD. Check INR on 8/9/24. Okay for US of LLE.    Provider Giving Order:  CIERA Templeton    Verbal Order given to: Herminia Graff RN

## 2024-08-09 NOTE — TELEPHONE ENCOUNTER
[facility] Geriatrics Triage Nurse INR     Provider: RAY Garrison CNP   Facility: [facility]   Facility Type:  [facility]    Caller: Zoraida  Call Back Number: [phone]   Reason for call: INR  Diagnosis/Goal: PAD    Date INR New Dose/Orders   7/19 2.0 6 mg M/F and 5 mg AOD    7/26 2.5 6 mg M/F and 5 mg AOD    8/2 2.6 6 mg M/F and 5 mg AOD    8/9 2.9 6 mg M and 5 mg AOD        Heparin/Lovenox:  No  Currently on ABX?: No  Other interacting medication:  None  Missed or refused doses: No    Verbal Order/Direction given by Provider: Coumadin 6 mg PO Mon and 5 mg PO AOD. Check INR on 8/23/24.    Provider Giving Order:  RAY Garrison CNP     Verbal Order given to: Zoraida Graff RN

## 2024-08-16 NOTE — TELEPHONE ENCOUNTER
[facility] Geriatrics Triage Nurse Telephone Encounter    Provider: RAY Garrison CNP   Facility: [facility]  Facility Type:  TCU    Caller: Myrtle  Call Back Number: [phone]    Allergies:  No Known Allergies     Reason for call: Pt reports pain in left hip/knee. Pain mostly in the knee from putting pressure and weight bearing on it. Requesting order for Lidocaine patch.    Verbal Order/Direction given by Provider:   - Lidocaine 4% Patch apply to left knee daily in AM off at HS    Provider giving Order:  RAY Cifuentes CNP    Verbal Order given to: Myrtle Sotelo RN

## 2024-08-23 NOTE — TELEPHONE ENCOUNTER
[facility] Geriatrics Triage Nurse INR     Provider: RAY Garrison CNP   Facility: [facility]   Facility Type:  LT    Caller: Zoraida  Call Back Number: [phone]  Reason for call: INR  Diagnosis/Goal: PAD    Date INR New Dose/Orders   7/26 2.5 6 mg M and 5 mg AOD    8/2 2.6 6 mg M and 5 mg AOD    8/9 2.9 6 mg M and 5 mg AOD    8/23 1.4 6 mg M and 5 mg AOD         Heparin/Lovenox:  No  Currently on ABX?: No  Other interacting medication:  None  Missed or refused doses: No    Verbal Order/Direction given by Provider: Continue Coumadin 6 mg PO Mon and 5 mg PO AOD. Check INR on 8/30/24.    Provider Giving Order:  CIERA Templeton    Verbal Order given to: Zoraida Graff RN

## 2024-08-30 NOTE — TELEPHONE ENCOUNTER
Orders given to CATHERINE Blackwood RN    ----- Message from Mely MUSE sent at 8/30/2024 11:22 AM CDT -----  Per Ellie Shi NP: Continue Coumadin 6mg M and 5mg AOD. Recheck in 3 weeks on 9/20/24

## 2024-09-10 NOTE — PROGRESS NOTES
"[facility] GERIATRICS  Chief Complaint   Patient presents with    [facility] Medical Record Number:  [id number]  Place of Service where encounter took place:  [facility] ([facility])     HPI:    Cory Berg  is 79 year old (1945), who is being seen today for a federally mandated E/M visit.     Course reviewed in Epic    [facility]        Current medications were reviewed by me today  Medical status has been stable  Pt states he feels \"ok,\" denies any specific physical concerns  Denies cough, chest pain, shortness of breath.  He denies dysuria.  He states his appetite has been good he states he tries to get outside in his wheelchair as often as possible.    Most recent labs at the end of July noted below  Vital signs have been stable        Dose / Directions   acetaminophen 325 MG tablet  Commonly known as: TYLENOL      Dose: 650 mg  Take 650 mg by mouth 3 times daily  Refills: 0     amLODIPine 10 MG tablet  Commonly known as: NORVASC      Dose: 10 mg  Take 10 mg by mouth daily  Refills: 0     atorvastatin 40 MG tablet  Commonly known as: LIPITOR      Dose: 40 mg  Take 40 mg by mouth daily  Refills: 0     Calcium Carbonate Antacid 600 MG Chew      Dose: 1 tablet  Take 1 tablet by mouth daily  Refills: 0     gabapentin 300 MG capsule  Commonly known as: NEURONTIN      Dose: 300 mg  Take 1 capsule (300 mg) by mouth at bedtime  Refills: 0     hydrALAZINE 10 MG tablet  Commonly known as: APRESOLINE      Dose: 25 mg  Take 25 mg by mouth 3 times daily  Refills: 0     Lidocaine 4 % Patch  Commonly known as: LIDOCARE      Dose: 1 patch  Place 1 patch onto the skin every 24 hours To left knee. To prevent lidocaine toxicity, patient should be patch free for 12 hrs daily.  Refills: 0     metoprolol tartrate 25 MG tablet  Commonly known as: LOPRESSOR      Dose: 100 mg  Take 100 mg by mouth 2 times daily  Refills: 0     polyethylene glycol 17 g " "packet  Commonly known as: MIRALAX      Dose: 17 g  Take 17 g by mouth daily  Refills: 0     * QUEtiapine 25 MG tablet  Commonly known as: SEROquel      Dose: 12.5 mg  Take 12.5 mg by mouth daily  Refills: 0     * QUEtiapine 25 MG tablet  Commonly known as: SEROquel      Dose: 6.25 mg  Take 6.25 mg by mouth daily  Refills: 0     sennosides 8.6 MG tablet  Commonly known as: SENOKOT      Dose: 1 tablet  Take 1 tablet by mouth 2 times daily  Refills: 0     sodium bicarbonate 650 MG tablet      Dose: 650 mg  Take 650 mg by mouth 2 times daily  Refills: 0     tamsulosin 0.4 MG capsule  Commonly known as: FLOMAX      Dose: 0.4 mg  Take 0.4 mg by mouth daily  Refills: 0     warfarin ANTICOAGULANT 5 MG tablet  Commonly known as: COUMADIN      Take by mouth daily Give as directed per Facility MR  Refills: 0              Vitals:  BP (!) 145/82   Pulse 78   Temp 98.2  F (36.8  C)   Resp 18   Ht 1.778 m (5' 10\")   Wt 91.2 kg (201 lb)   SpO2 98%   BMI 28.84 kg/m    Body mass index is 28.84 kg/m .  Exam:  Sleepy, easily awakens to name, lying in bed, appears comfortable  Pleasant, oriented to self and surroundings  Lungs clear  CV regular rhythm  Abdomen soft  1-2+ left ankle and pretibial edema  No right lower extremity edema  No gross focal weakness as assessed the patient lying in bed    Lab/Diagnostic data:   Most Recent 3 CBC's:  Recent Labs   Lab Test 07/15/24  0630 07/08/24  0541   WBC  --  8.1   HGB 8.9* 9.7*   MCV  --  91   PLT  --  183     Most Recent 3 BMP's:  Recent Labs   Lab Test 07/29/24  0527 07/17/24  0504 07/15/24  0630    142 147*   POTASSIUM 4.4 4.2 4.0   CHLORIDE 112* 112* 116*   CO2 26 23 22   BUN 46.0* 40.3* 38.3*   CR 2.32* 2.17* 2.15*   ANIONGAP 6* 7 9   MATHIEU 7.8* 8.2* 8.3*   * 117* 117*       ASSESSMENT/PLAN    Chronic kidney disease stage IV  Hypertension  Blood pressure stable on amlodipine, hydralazine, metoprolol  Patient remains on sodium bicarbonate  Plan: Continue current " medications, monitor blood pressure, BMP    Depression  Dementia  Patient has moved to long-term care  Behaviors appears stable on Seroquel  Plan: Continue to monitor    Diabetes mellitus type 2  Previously on Jardiance, discontinued secondary to reduction in GFR  Plan: Monitor blood glucoses, periodic hemoglobin A1c monitoring    Anemia secondary to chronic illness and recent left hip fracture complicated by thigh hematoma  Plan: Monitor hemoglobin    History of PAD  Status post left lower extremity bypass graft  Chronic lower extremity edema  Plan: Continue warfarin, lymphedema treatment    History of UTI most recently with Enterococcus  No current  symptoms  Plan: Monitor      Christo Nieves MD

## 2024-09-10 NOTE — LETTER
" 9/10/2024      Cory Berg  [address] [address]        [facility] GERIATRICS  Chief Complaint   Patient presents with     snf Regulatory     [place] Medical Record Number:  [id number]  Place of Service where encounter took place:  [facility] ([facility])     HPI:    Cory Berg  is 79 year old (1945), who is being seen today for a federally mandated E/M visit.     Course reviewed in [facility]    Pt has transferred to [facility] from [facility]        Current medications were reviewed by me today  Medical status has been stable  Pt states he feels \"ok,\" denies any specific physical concerns  Denies cough, chest pain, shortness of breath.  He denies dysuria.  He states his appetite has been good he states he tries to get outside in his wheelchair as often as possible.    Most recent labs at the end of July noted below  Vital signs have been stable        Dose / Directions   acetaminophen 325 MG tablet  Commonly known as: TYLENOL      Dose: 650 mg  Take 650 mg by mouth 3 times daily  Refills: 0     amLODIPine 10 MG tablet  Commonly known as: NORVASC      Dose: 10 mg  Take 10 mg by mouth daily  Refills: 0     atorvastatin 40 MG tablet  Commonly known as: LIPITOR      Dose: 40 mg  Take 40 mg by mouth daily  Refills: 0     Calcium Carbonate Antacid 600 MG Chew      Dose: 1 tablet  Take 1 tablet by mouth daily  Refills: 0     gabapentin 300 MG capsule  Commonly known as: NEURONTIN      Dose: 300 mg  Take 1 capsule (300 mg) by mouth at bedtime  Refills: 0     hydrALAZINE 10 MG tablet  Commonly known as: APRESOLINE      Dose: 25 mg  Take 25 mg by mouth 3 times daily  Refills: 0     Lidocaine 4 % Patch  Commonly known as: LIDOCARE      Dose: 1 patch  Place 1 patch onto the skin every 24 hours To left knee. To prevent lidocaine toxicity, patient should be patch free for 12 hrs daily.  Refills: 0     metoprolol tartrate 25 MG tablet  Commonly known as: LOPRESSOR      Dose: 100 mg  Take 100 mg " "by mouth 2 times daily  Refills: 0     polyethylene glycol 17 g packet  Commonly known as: MIRALAX      Dose: 17 g  Take 17 g by mouth daily  Refills: 0     * QUEtiapine 25 MG tablet  Commonly known as: SEROquel      Dose: 12.5 mg  Take 12.5 mg by mouth daily  Refills: 0     * QUEtiapine 25 MG tablet  Commonly known as: SEROquel      Dose: 6.25 mg  Take 6.25 mg by mouth daily  Refills: 0     sennosides 8.6 MG tablet  Commonly known as: SENOKOT      Dose: 1 tablet  Take 1 tablet by mouth 2 times daily  Refills: 0     sodium bicarbonate 650 MG tablet      Dose: 650 mg  Take 650 mg by mouth 2 times daily  Refills: 0     tamsulosin 0.4 MG capsule  Commonly known as: FLOMAX      Dose: 0.4 mg  Take 0.4 mg by mouth daily  Refills: 0     warfarin ANTICOAGULANT 5 MG tablet  Commonly known as: COUMADIN      Take by mouth daily Give as directed per Facility MR  Refills: 0              Vitals:  BP (!) 145/82   Pulse 78   Temp 98.2  F (36.8  C)   Resp 18   Ht 1.778 m (5' 10\")   Wt 91.2 kg (201 lb)   SpO2 98%   BMI 28.84 kg/m    Body mass index is 28.84 kg/m .  Exam:  Sleepy, easily awakens to name, lying in bed, appears comfortable  Pleasant, oriented to self and surroundings  Lungs clear  CV regular rhythm  Abdomen soft  1-2+ left ankle and pretibial edema  No right lower extremity edema  No gross focal weakness as assessed the patient lying in bed    Lab/Diagnostic data:   Most Recent 3 CBC's:  Recent Labs   Lab Test 07/15/24  0630 07/08/24  0541   WBC  --  8.1   HGB 8.9* 9.7*   MCV  --  91   PLT  --  183     Most Recent 3 BMP's:  Recent Labs   Lab Test 07/29/24  0527 07/17/24  0504 07/15/24  0630    142 147*   POTASSIUM 4.4 4.2 4.0   CHLORIDE 112* 112* 116*   CO2 26 23 22   BUN 46.0* 40.3* 38.3*   CR 2.32* 2.17* 2.15*   ANIONGAP 6* 7 9   MATHIEU 7.8* 8.2* 8.3*   * 117* 117*       ASSESSMENT/PLAN    Chronic kidney disease stage IV  Hypertension  Blood pressure stable on amlodipine, hydralazine, " metoprolol  Patient remains on sodium bicarbonate  Plan: Continue current medications, monitor blood pressure, BMP    Depression  Dementia  Patient has moved to long-term care  Behaviors appears stable on Seroquel  Plan: Continue to monitor    Diabetes mellitus type 2  Previously on Jardiance, discontinued secondary to reduction in GFR  Plan: Monitor blood glucoses, periodic hemoglobin A1c monitoring    Anemia secondary to chronic illness and recent left hip fracture complicated by thigh hematoma  Plan: Monitor hemoglobin    History of PAD  Status post left lower extremity bypass graft  Chronic lower extremity edema  Plan: Continue warfarin, lymphedema treatment    History of UTI most recently with Enterococcus  No current  symptoms  Plan: Monitor      Christo Nieves MD      Sincerely,        Christo Nieves MD

## 2024-09-21 PROBLEM — I48.20 CHRONIC ATRIAL FIBRILLATION (H): Status: ACTIVE | Noted: 2024-01-01

## 2024-09-21 NOTE — PROGRESS NOTES
[facility] GERIATRICS  [place] Medical Record Number:  [id number]  Place of Service where encounter took place: [facility] ([facility]) [[id number]]    HPI:    Cory Berg is a 79 year old  (1945), who is being seen today for an episodic care visit at the above location. Today's concern is INR/Coumadin management for A. Fib    ROS/Subjective:  Bleeding Signs/Symptoms:  None  Thromboembolic Signs/Symptoms:  None  Medication Changes:  No  Dietary Changes:  No  Activity Changes: No  Bacterial/Viral Infection:  No  Missed Coumadin Doses:  None  On ASA: No  Other Concerns:  No    OBJECTIVE:  BP (!) 158/74   Pulse 88   Temp 98.2  F (36.8  C)   Resp 18   Wt 91.2 kg (201 lb)   SpO2 97%   BMI 28.84 kg/m    Last INR: 2.2 on 8/30  INR Today:  4.54  Current Dose:  6 mg on Monday and 5 mg AOD    ASSESSMENT:  1. Chronic atrial fibrillation (H)    2. Encounter for therapeutic drug monitoring    3. Long term current use of anticoagulant therapy      Supratherapeutic INR for goal of 2-3    PLAN/ORDERS:   New Dose: hold Friday and Saturday, 1 mg on Sunday    Next INR: 9/23  Concern for infection given this very elevated INR when he is generally stable with a once a month INR check. Room has a urine odor, has urinary retention so could have UTI. Hx of sepsis with UTI so will check UA/C today.     Electronically signed by:  RAY Mello CNP

## 2024-09-23 NOTE — TELEPHONE ENCOUNTER
[facility] Geriatrics Triage Nurse INR     Provider: RAY Garrison CNP   Facility: [facility]   Facility Type:  [facility]    Caller: Oliver  Reason for call: INR  Diagnosis/Goal: A. Fib    Date INR New Dose/Orders   8/30 2.2 6mg q M and 5mg AOD   9/20 4.54 Hold x2, then 1mg on Sunday 9/23 1.7 2.5mg daily        Heparin/Lovenox:  No  Currently on ABX?: No  Other interacting medication:  None  Missed or refused doses: No    Verbal Order/Direction given by Provider:   - Coumadin 2.5mg daily  - Recheck INR on 9/26/24    Provider Giving Order:  RAY Garrison CNP     Verbal Order given to: Oliver Sotelo RN

## 2024-09-23 NOTE — PROGRESS NOTES
[facility] GERIATRICS    Chief Complaint   Patient presents with    RECHECK     HPI:  Cory Berg is a 79 year old  (1945), who is being seen today for an episodic care visit at: [facility] ([facility] ([facility]) [[id number]]. Today's concern is:     London was visited today while in his room.  Care attendant staff are changing his briefs as he just had a bowel movement.  Staff has not noted any bloody or black stool.  Today London denies any abdominal pain or discomfort.  He has not had any fever or chills.  States that at the start of urination it does burn but that clears when straining to get started. Urinalysis completed but not refluxed to culture.    Allergies, and PMH/PSH reviewed in EPIC today.  REVIEW OF SYSTEMS:  4 point ROS including Respiratory, CV, GI and , other than that noted in the HPI,  is negative    Objective:   BP (!) 176/75   Pulse 88   Temp 98.2  F (36.8  C)   Resp 18   Wt 91.2 kg (201 lb)   SpO2 97%   BMI 28.84 kg/m    GENERAL APPEARANCE:  Alert, in no distress, irritable  EYES: no discharge or mattering on lids or lashes noted  ENT:  moist mucous membranes, hearing acuity intact  NECK: supple, symmetrical  RESP: no respiratory distress, Lung sounds clear, patient is on room air  CV:  rate and rhythm regular, no murmur. Edema none noted.   VASCULAR: warm extremities without open areas.  ABDOMEN: normal bowel sounds, soft, nontender.  M/S:   Gait and station ambulates with walker, no tenderness or swelling of the joints; able to move all extremities   SKIN:  Inspection and palpation of skin and subcutaneous tissue: skin warm, dry and intact without rashes  NEURO: no facial asymmetry, no speech deficits and able to follow directions, moves all extremities symmetrically  PSYCH:  insight and judgement intact, memory impaired, affect and mood normal    CBC RESULTS:   Recent Labs   Lab Test 09/23/24  0705 09/16/24  0613   WBC 6.9 9.1   RBC 2.85* 2.86*   HGB 8.2* 8.0*   HCT 26.1* 26.2*    MCV 92 92   MCH 28.8 28.0   MCHC 31.4* 30.5*   RDW 14.6 14.4    205       Last Basic Metabolic Panel:  Recent Labs   Lab Test 09/16/24  0613 07/29/24  0527   * 144   POTASSIUM 4.3 4.4   CHLORIDE 117* 112*   MATHIEU 8.1* 7.8*   CO2 23 26   BUN 41.6* 46.0*   CR 2.39* 2.32*   * 123*       Lab Results   Component Value Date    A1C 6.5 09/16/2024       Assessment/Plan:  Stage 4 Chronic kidney disease   Progressing. Baseline creatinine around 1.6-1.7 and increased up to the 2 ranges for duration of the TCU stay. And 2.39 now. Of note, Jardiance and lisinopril were stopped while in the TCU.   -- sodium bicarbonate BID  -- follow up with nephrologist as soon as possible.     Depression  Dementia  SLUMS 17/30. Irritable throughout the day at times swearing at staff and asking them to leave. Other times, he is pleasant and likes to go outside and enjoy the sunshine.   --Continue with seroquel 6.25 mg at 4 pm, 12.5 mg at HS.   -- consider morning dose of seroquel If behaviors are ongoing. Also consider SSRI    Anemia  Has had anemia since series of hospitalizations earlier this year.  Since moving to the long-term care his hemoglobin has decreased and was 8.0 last week.  We are still working on obtaining stool guaiacs and he is on famotidine for GI protection.  I visualized the stool today and there did not appear to be any blood and it was not black in color.  Suspect that his anemia is due to chronic disease with his worsening kidney function.  He may need EPO injections.  -- Stool guaiacs  -- Follow-up with nephrology    Type 2 diabetes mellitus with stage 3a chronic kidney disease, without long-term current use of insulin ([facility]C)  Diabetic peripheral neuropathy associated with type 2 diabetes mellitus ([facility]C)  A1c of 6.5 on 11/13/23 and 6.5 9/16/24.  Jardiance was discontinued given his GFR fell below 30 while in the TCU.   --gabapentin for neuropathy    Essential hypertension ([facility])  SBPs 140-160s which is  appropriate given his history of falls  --Continue to monitor blood pressure and heart rate, adjust medications as needed.  -- amlodipine 10 mg daily and metoprolol 100 mg BID   -- continue with hydralazine 25 mg TID.   -- Continue to monitor blood pressure and heart rate, adjust medications as needed.    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 Surveillance of lower extremity U/S on 6/20 without significant change from November 2023. 50-75% stenosis of distal anastomosis of left LE bypass graft.  INR supratherapeutic last week to 4.54 when previously he was very stable with a monthly draw.  Concerned that he may have an underlying infection driving his up so we have ordered a UA as noted below.  --continue with coumadin/INR    Urinary tract infection  Recurrent. Urine culture grew Enterococcus last hospitalization.  Has had sepsis x 2 this year for urinary tract infections.  Given his supratherapeutic INR as well as the burning upon starting urination a UA was obtained.  Unfortunately it was not reflexed to culture so unclear if he has an infection or not.  Would like to be aggressive with antibiotic to prevent a hospitalization should this indeed be an infection so we will start amoxicillin but prior to administering first dose, staff to obtain another urine sample and send only for culture, no urinalysis is needed.       MED REC REQUIRED  Post Medication Reconciliation Status: medication reconcilation previously completed during another office visit      Electronically signed by: RAY Mello CNP

## 2024-09-23 NOTE — LETTER
9/23/2024      Cory Berg  [address] [address]        [facility] GERIATRICS    Chief Complaint   Patient presents with     RECHECK     HPI:  Cory Berg is a 79 year old  (1945), who is being seen today for an episodic care visit at: [facility] ([facility]) [[id number]]. Today's concern is:     London was visited today while in his room.  Care attendant staff are changing his briefs as he just had a bowel movement.  Staff has not noted any bloody or black stool.  Today London denies any abdominal pain or discomfort.  He has not had any fever or chills.  States that at the start of urination it does burn but that clears when straining to get started. Urinalysis completed but not refluxed to culture.    Allergies, and PMH/PSH reviewed in EPIC today.  REVIEW OF SYSTEMS:  4 point ROS including Respiratory, CV, GI and , other than that noted in the HPI,  is negative    Objective:   BP (!) 176/75   Pulse 88   Temp 98.2  F (36.8  C)   Resp 18   Wt 91.2 kg (201 lb)   SpO2 97%   BMI 28.84 kg/m    GENERAL APPEARANCE:  Alert, in no distress, irritable  EYES: no discharge or mattering on lids or lashes noted  ENT:  moist mucous membranes, hearing acuity intact  NECK: supple, symmetrical  RESP: no respiratory distress, Lung sounds clear, patient is on room air  CV:  rate and rhythm regular, no murmur. Edema none noted.   VASCULAR: warm extremities without open areas.  ABDOMEN: normal bowel sounds, soft, nontender.  M/S:   Gait and station ambulates with walker, no tenderness or swelling of the joints; able to move all extremities   SKIN:  Inspection and palpation of skin and subcutaneous tissue: skin warm, dry and intact without rashes  NEURO: no facial asymmetry, no speech deficits and able to follow directions, moves all extremities symmetrically  PSYCH:  insight and judgement intact, memory impaired, affect and mood normal    CBC RESULTS:   Recent Labs   Lab Test 09/23/24  0705  09/16/24  0613   WBC 6.9 9.1   RBC 2.85* 2.86*   HGB 8.2* 8.0*   HCT 26.1* 26.2*   MCV 92 92   MCH 28.8 28.0   MCHC 31.4* 30.5*   RDW 14.6 14.4    205       Last Basic Metabolic Panel:  Recent Labs   Lab Test 09/16/24  0613 07/29/24  0527   * 144   POTASSIUM 4.3 4.4   CHLORIDE 117* 112*   MATHIEU 8.1* 7.8*   CO2 23 26   BUN 41.6* 46.0*   CR 2.39* 2.32*   * 123*       Lab Results   Component Value Date    A1C 6.5 09/16/2024       Assessment/Plan:  Stage 4 Chronic kidney disease   Progressing. Baseline creatinine around 1.6-1.7 and increased up to the 2 ranges for duration of the TCU stay. And 2.39 now. Of note, Jardiance and lisinopril were stopped while in the TCU.   -- sodium bicarbonate BID  -- follow up with nephrologist as soon as possible.     Depression  Dementia  SLUMS 17/30. Irritable throughout the day at times swearing at staff and asking them to leave. Other times, he is pleasant and likes to go outside and enjoy the sunshine.   --Continue with seroquel 6.25 mg at 4 pm, 12.5 mg at HS.   -- consider morning dose of seroquel If behaviors are ongoing. Also consider SSRI    Anemia  Has had anemia since series of hospitalizations earlier this year.  Since moving to the long-term care his hemoglobin has decreased and was 8.0 last week.  We are still working on obtaining stool guaiacs and he is on famotidine for GI protection.  I visualized the stool today and there did not appear to be any blood and it was not black in color.  Suspect that his anemia is due to chronic disease with his worsening kidney function.  He may need EPO injections.  -- Stool guaiacs  -- Follow-up with nephrology    Type 2 diabetes mellitus with stage 3a chronic kidney disease, without long-term current use of insulin ([facility]C)  Diabetic peripheral neuropathy associated with type 2 diabetes mellitus ([facility])  A1c of 6.5 on 11/13/23 and 6.5 9/16/24.  Jardiance was discontinued given his GFR fell below 30 while in the TCU.    --gabapentin for neuropathy    Essential hypertension ([facility])  SBPs 140-160s which is appropriate given his history of falls  --Continue to monitor blood pressure and heart rate, adjust medications as needed.  -- amlodipine 10 mg daily and metoprolol 100 mg BID   -- continue with hydralazine 25 mg TID.   -- Continue to monitor blood pressure and heart rate, adjust medications as needed.    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 Surveillance of lower extremity U/S on 6/20 without significant change from November 2023. 50-75% stenosis of distal anastomosis of left LE bypass graft.  INR supratherapeutic last week to 4.54 when previously he was very stable with a monthly draw.  Concerned that he may have an underlying infection driving his up so we have ordered a UA as noted below.  --continue with coumadin/INR    Urinary tract infection  Recurrent. Urine culture grew Enterococcus last hospitalization.  Has had sepsis x 2 this year for urinary tract infections.  Given his supratherapeutic INR as well as the burning upon starting urination a UA was obtained.  Unfortunately it was not reflexed to culture so unclear if he has an infection or not.  Would like to be aggressive with antibiotic to prevent a hospitalization should this indeed be an infection so we will start amoxicillin but prior to administering first dose, staff to obtain another urine sample and send only for culture, no urinalysis is needed.       MED REC REQUIRED  Post Medication Reconciliation Status: medication reconcilation previously completed during another office visit      Electronically signed by: RAY Mello CNP                 .      Sincerely,        RAY Mello CNP

## 2024-09-30 NOTE — LETTER
9/30/2024      Cory Berg  [address] [address]        [facility] GERIATRICS  [place] Medical Record Number:  [id number]  Place of Service where encounter took place: [facility] ([facility]) [[id number]]    HPI:    Cory Berg is a 79 year old  (1945), who is being seen today for an episodic care visit at the above location. Today's concern is INR/Coumadin management for A. Fib    ROS/Subjective:  Bleeding Signs/Symptoms:  None  Thromboembolic Signs/Symptoms:  None  Medication Changes:  No  Dietary Changes:  No  Activity Changes: No  Bacterial/Viral Infection:  No  Missed Coumadin Doses:  None  On ASA: No  Other Concerns:  No    OBJECTIVE:  BP (!) 176/68   Pulse 88   Temp 98.6  F (37  C)   Resp 18   Wt 91.2 kg (201 lb)   SpO2 97%   BMI 28.84 kg/m    Last INR: 1.4 on 9/26  INR Today:  was not drawn today. Give 4 mg on 9/30. INR received on 10/1 and was 1.7  Current Dose:  4 mg daily     ASSESSMENT:     PAD (peripheral artery disease) (H)  Chronic atrial fibrillation (H)  Long term current use of anticoagulant therapy  Encounter for therapeutic drug monitoring  Subtherapeutic INR for goal of 2-3    PLAN/ORDERS:   New Dose: coumadin 5 mg daily     Next INR: 1 week    Electronically signed by:  RAY Mello CNP       Sincerely,        RAY Mello CNP

## 2024-10-04 NOTE — PROGRESS NOTES
[facility] GERIATRICS  [place] Medical Record Number:  [id number]  Place of Service where encounter took place: [facility] ([facility]) [[id number]]    HPI:    Cory Berg is a 79 year old  (1945), who is being seen today for an episodic care visit at the above location. Today's concern is INR/Coumadin management for A. Fib    ROS/Subjective:  Bleeding Signs/Symptoms:  None  Thromboembolic Signs/Symptoms:  None  Medication Changes:  No  Dietary Changes:  No  Activity Changes: No  Bacterial/Viral Infection:  No  Missed Coumadin Doses:  None  On ASA: No  Other Concerns:  No    OBJECTIVE:  BP (!) 176/68   Pulse 88   Temp 98.6  F (37  C)   Resp 18   Wt 91.2 kg (201 lb)   SpO2 97%   BMI 28.84 kg/m    Last INR: 1.4 on 9/26  INR Today:  was not drawn today. Give 4 mg on 9/30. INR received on 10/1 and was 1.7  Current Dose:  4 mg daily     ASSESSMENT:     PAD (peripheral artery disease) (H)  Chronic atrial fibrillation (H)  Long term current use of anticoagulant therapy  Encounter for therapeutic drug monitoring  Subtherapeutic INR for goal of 2-3    PLAN/ORDERS:   New Dose: coumadin 5 mg daily     Next INR: 1 week    Electronically signed by:  RAY Mello CNP

## 2024-10-14 NOTE — PROGRESS NOTES
[facility] GERIATRICS    PRIMARY CARE PROVIDER AND CLINIC:  RAY Mello CNP, [address] / [place] [address]  Chief Complaint   Patient presents with    Hospital F/U      [place] Medical Record Number:  [id number]  Place of Service where encounter took place:  [facility] ([facility]) [[id number]]    Cory was visited today after a hospital stay at [facility] 10/6/2024 through 10/11/2024 for acute respiratory failure secondary to pneumonia.  Also had acute kidney injury superimposed on his chronic kidney disease, urinary tract infection as well.  He did require oxygen throughout his stay but after receiving treatment with ceftriaxone and doxycycline he was able to wean off of that.  Possible undiagnosed sleep apnea as he did require oxygen in the evenings but otherwise on room air during the day.  Lisinopril and hydralazine were held while in the hospital but restarted at discharge.  Altered mental status did start to clear toward the end of hospitalization.  He was transition to oral Augmentin for the urinary tract infection and discharged back to [facility].    London was visited today with his daughter Holly present at the bedside.  Upon entering the room, London was lying flat in the bed and looked like he was working to breathe.  He was repositioned with boosting up to the to the top of the bed and had a bed was raised.  He felt more comfortable in this position and was more alert.  Stepped out of the room to connect with Holly and upon reentering the room a few minutes later, London was sitting up in the bed and eating popcorn, eyes wide open and feeling well.  Oxygen sats 94% on room air.     Daughter Holly is concerned about the confusion that has been ongoing since he returned back from the hospital.  Discussed with Holly today that this is likely delirium in the setting of his underlying dementia and the recent hospitalization and infection has  caused this.  Of increased confusion but hopeful this will begin to clear as he works with our therapist here at the facility.  She agrees to have our speech therapist evaluate London to ensure that he is swallowing safely.  We also discussed probable sleep apnea.  Family is concerned over his mood and feeling depressed which has been longstanding.  They are on board with starting an selective serotonin reuptake inhibitor.    CODE STATUS/ADVANCE DIRECTIVES DISCUSSION:  No CPR- Do NOT Intubate  CPR/Full code   ALLERGIES: No Known Allergies   PAST MEDICAL HISTORY: No past medical history on file.   PAST SURGICAL HISTORY:   has a past surgical history that includes IR Lower Extremity Angiogram Left (9/10/2020).  FAMILY HISTORY: family history is not on file.  SOCIAL HISTORY:     Patient's living condition: lives in a skilled nursing facility    Post Discharge Medication Reconciliation Status:   MED REC REQUIRED  Post Medication Reconciliation Status: discharge medications reconciled and changed, per note/orders       Current Outpatient Medications   Medication Sig Dispense Refill    citalopram (CELEXA) 10 MG tablet Take 1 tablet (10 mg) by mouth daily.      acetaminophen (TYLENOL) 325 MG tablet Take 650 mg by mouth 3 times daily      amLODIPine (NORVASC) 10 MG tablet Take 10 mg by mouth daily      atorvastatin (LIPITOR) 40 MG tablet Take 40 mg by mouth daily      Calcium Carbonate Antacid 600 MG CHEW Take 1 tablet by mouth daily      gabapentin (NEURONTIN) 300 MG capsule Take 1 capsule (300 mg) by mouth at bedtime      hydrALAZINE (APRESOLINE) 10 MG tablet Take 25 mg by mouth 3 times daily      Lidocaine (LIDOCARE) 4 % Patch Place 1 patch onto the skin every 24 hours To left knee. To prevent lidocaine toxicity, patient should be patch free for 12 hrs daily.      metoprolol tartrate (LOPRESSOR) 25 MG tablet Take 100 mg by mouth 2 times daily      polyethylene glycol (MIRALAX) 17 g packet Take 17 g by mouth daily       QUEtiapine (SEROQUEL) 25 MG tablet Take 12.5 mg by mouth daily      QUEtiapine (SEROQUEL) 25 MG tablet Take 6.25 mg by mouth daily      sennosides (SENOKOT) 8.6 MG tablet Take 1 tablet by mouth 2 times daily      sodium bicarbonate 650 MG tablet Take 650 mg by mouth 2 times daily      tamsulosin (FLOMAX) 0.4 MG capsule Take 0.4 mg by mouth daily      warfarin ANTICOAGULANT (COUMADIN) 5 MG tablet Take by mouth daily Give as directed per Facility MR       No current facility-administered medications for this visit.       ROS:  Limited secondary to cognitive impairment but today pt reports as noted above.     Vitals:  BP (!) 146/55   Pulse 82   Temp 98.6  F (37  C)   Resp 20   Wt 93 kg (205 lb)   SpO2 95%   BMI 29.41 kg/m    Exam:  GENERAL APPEARANCE:  Alert, in no distress, irritable   EYES: no discharge or mattering on lids or lashes noted  ENT:  moist mucous membranes, hearing acuity intact  NECK: supple, symmetrical  RESP: no respiratory distress, Lung sounds clear, patient is on room air  CV:  rate and rhythm regular, no murmur. Edema none noted.   VASCULAR: warm extremities without open areas.  ABDOMEN: normal bowel sounds, soft, nontender.  M/S:   Gait and station: uses wheelchair, no tenderness or swelling of the joints; able to move all extremities   SKIN:  Inspection and palpation of skin and subcutaneous tissue: skin warm, dry and intact without rashes  NEURO: no facial asymmetry, no speech deficits and able to follow directions, moves all extremities symmetrically  PSYCH:  insight and judgement intact, memory impaired, affect and mood normal    Lab/Diagnostic data:  CBC RESULTS:   Recent Labs   Lab Test 09/23/24  0705 09/16/24  0613   WBC 6.9 9.1   RBC 2.85* 2.86*   HGB 8.2* 8.0*   HCT 26.1* 26.2*   MCV 92 92   MCH 28.8 28.0   MCHC 31.4* 30.5*   RDW 14.6 14.4    205       Last Basic Metabolic Panel:  Recent Labs   Lab Test 09/16/24  0613 07/29/24  0527   * 144   POTASSIUM 4.3 4.4    CHLORIDE 117* 112*   MATHIEU 8.1* 7.8*   CO2 23 26   BUN 41.6* 46.0*   CR 2.39* 2.32*   * 123*       Lab Results   Component Value Date    A1C 6.5 09/16/2024       ASSESSMENT/PLAN:  Pneumonia  Was treated with ceftriaxone and doxycycline ultimately transition to Augmentin on 10/9/2024.  Concerned that this may be an aspiration pneumonia therefore will have our speech therapist available him here.  Upon entering the room today it does look like he was short of breath but he was lying flat.  After repositioning, boosting him up to the head of the bed and head of bed elevated, he looked much more comfortable, and was more alert.  -- Continue with Augmentin twice daily to complete the course of treatment  -- SLP to evaluate  -- Continue to encourage the use of oxygen however he does not like this and continues to remove it once it is placed.  Oxygen saturations are 94% on room air today    Urinary tract infection  Has had many infections over the past year.  Urine culture with Enterococcus faecalis.   -- Complete course of Augmentin.    Stage 4 Chronic kidney disease   Progressing. Baseline creatinine around 1.6-1.7 and over the past several months his readings have been a creatinine of 2.3.  During hospitalization this was up to 2.6 in the setting of infection and dehydration.  Of note, Jardiance and lisinopril were stopped while in the TCU.   -- sodium bicarbonate BID  -- follow up with nephrologist as soon as possible.   -- BMP this week.     Depression  Dementia  SLUMS 17/30. Irritable throughout the day at times swearing at staff and asking them to leave.  Family is concerned due to ongoing depression and they would like SSRI started.  --Will start citalopram 10 mg daily.  --Continue with seroquel 6.25 mg at 4 pm, 12.5 mg at HS.   -- consider morning dose of seroquel If behaviors are ongoing     Anemia  Has had anemia since series of hospitalizations earlier this year.  Since moving to the long-term care his  hemoglobin has decreased and was 8.0 likely due to progression of underlying chronic kidney disease but also in the setting of acute illness.  Hemoglobin upon discharge was 8.1.  -- Follow-up with nephrology  -- CBC later this week    Type 2 diabetes mellitus with stage 4 chronic kidney disease, without long-term current use of insulin ([facility])  Diabetic peripheral neuropathy associated with type 2 diabetes mellitus ([facility])  A1c of 6.5 on 11/13/23 and 6.5 9/16/24.  Jardiance was discontinued given his GFR fell below 30 while in the TCU.   --gabapentin for neuropathy    Essential hypertension ([facility])  SBPs 140-160s which is appropriate given his history of falls  --Continue to monitor blood pressure and heart rate, adjust medications as needed.  -- amlodipine 10 mg daily and metoprolol 100 mg BID   -- continue with hydralazine 25 mg TID.   -- Continue to monitor blood pressure and heart rate, adjust medications as needed.    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 Surveillance of lower extremity U/S on 6/20 without significant change from November 2023. 50-75% stenosis of distal anastomosis of left LE bypass graft.  INR is scheduled to be drawn tomorrow.  --continue with coumadin/INR    Electronically signed by:  RAY Mello CNP

## 2024-10-14 NOTE — LETTER
10/14/2024      Cory Berg  [address] [facility] [address]        [facility] GERIATRICS    PRIMARY CARE PROVIDER AND CLINIC:  RAY Mello [facility], [address] / [place] [address]  Chief Complaint   Patient presents with     Hospital F/U      [place] Medical Record Number:  [id number]  Place of Service where encounter took place:  [facility] ([facility]) [[id number]]    Cory was visited today after a hospital stay at [facility] 10/6/2024 through 10/11/2024 for acute respiratory failure secondary to pneumonia.  Also had acute kidney injury superimposed on his chronic kidney disease, urinary tract infection as well.  He did require oxygen throughout his stay but after receiving treatment with ceftriaxone and doxycycline he was able to wean off of that.  Possible undiagnosed sleep apnea as he did require oxygen in the evenings but otherwise on room air during the day.  Lisinopril and hydralazine were held while in the hospital but restarted at discharge.  Altered mental status did start to clear toward the end of hospitalization.  He was transition to oral Augmentin for the urinary tract infection and discharged back to [facility].    London was visited today with his daughter Holly present at the bedside.  Upon entering the room, London was lying flat in the bed and looked like he was working to breathe.  He was repositioned with boosting up to the to the top of the bed and had a bed was raised.  He felt more comfortable in this position and was more alert.  Stepped out of the room to connect with Holly and upon reentering the room a few minutes later, London was sitting up in the bed and eating popcorn, eyes wide open and feeling well.  Oxygen sats 94% on room air.     Daughter Holly is concerned about the confusion that has been ongoing since he returned back from the hospital.  Discussed with Holly today that this is likely delirium in the setting  of his underlying dementia and the recent hospitalization and infection has caused this.  Of increased confusion but hopeful this will begin to clear as he works with our therapist here at the facility.  She agrees to have our speech therapist evaluate London to ensure that he is swallowing safely.  We also discussed probable sleep apnea.  Family is concerned over his mood and feeling depressed which has been longstanding.  They are on board with starting an selective serotonin reuptake inhibitor.    CODE STATUS/ADVANCE DIRECTIVES DISCUSSION:  No CPR- Do NOT Intubate  CPR/Full code   ALLERGIES: No Known Allergies   PAST MEDICAL HISTORY: No past medical history on file.   PAST SURGICAL HISTORY:   has a past surgical history that includes IR Lower Extremity Angiogram Left (9/10/2020).  FAMILY HISTORY: family history is not on file.  SOCIAL HISTORY:     Patient's living condition: lives in a skilled nursing facility    Post Discharge Medication Reconciliation Status:   MED REC REQUIRED  Post Medication Reconciliation Status: discharge medications reconciled and changed, per note/orders       Current Outpatient Medications   Medication Sig Dispense Refill     citalopram (CELEXA) 10 MG tablet Take 1 tablet (10 mg) by mouth daily.       acetaminophen (TYLENOL) 325 MG tablet Take 650 mg by mouth 3 times daily       amLODIPine (NORVASC) 10 MG tablet Take 10 mg by mouth daily       atorvastatin (LIPITOR) 40 MG tablet Take 40 mg by mouth daily       Calcium Carbonate Antacid 600 MG CHEW Take 1 tablet by mouth daily       gabapentin (NEURONTIN) 300 MG capsule Take 1 capsule (300 mg) by mouth at bedtime       hydrALAZINE (APRESOLINE) 10 MG tablet Take 25 mg by mouth 3 times daily       Lidocaine (LIDOCARE) 4 % Patch Place 1 patch onto the skin every 24 hours To left knee. To prevent lidocaine toxicity, patient should be patch free for 12 hrs daily.       metoprolol tartrate (LOPRESSOR) 25 MG tablet Take 100 mg by mouth 2 times  daily       polyethylene glycol (MIRALAX) 17 g packet Take 17 g by mouth daily       QUEtiapine (SEROQUEL) 25 MG tablet Take 12.5 mg by mouth daily       QUEtiapine (SEROQUEL) 25 MG tablet Take 6.25 mg by mouth daily       sennosides (SENOKOT) 8.6 MG tablet Take 1 tablet by mouth 2 times daily       sodium bicarbonate 650 MG tablet Take 650 mg by mouth 2 times daily       tamsulosin (FLOMAX) 0.4 MG capsule Take 0.4 mg by mouth daily       warfarin ANTICOAGULANT (COUMADIN) 5 MG tablet Take by mouth daily Give as directed per Facility MR       No current facility-administered medications for this visit.       ROS:  Limited secondary to cognitive impairment but today pt reports as noted above.     Vitals:  BP (!) 146/55   Pulse 82   Temp 98.6  F (37  C)   Resp 20   Wt 93 kg (205 lb)   SpO2 95%   BMI 29.41 kg/m    Exam:  GENERAL APPEARANCE:  Alert, in no distress, irritable   EYES: no discharge or mattering on lids or lashes noted  ENT:  moist mucous membranes, hearing acuity intact  NECK: supple, symmetrical  RESP: no respiratory distress, Lung sounds clear, patient is on room air  CV:  rate and rhythm regular, no murmur. Edema none noted.   VASCULAR: warm extremities without open areas.  ABDOMEN: normal bowel sounds, soft, nontender.  M/S:   Gait and station: uses wheelchair, no tenderness or swelling of the joints; able to move all extremities   SKIN:  Inspection and palpation of skin and subcutaneous tissue: skin warm, dry and intact without rashes  NEURO: no facial asymmetry, no speech deficits and able to follow directions, moves all extremities symmetrically  PSYCH:  insight and judgement intact, memory impaired, affect and mood normal    Lab/Diagnostic data:  CBC RESULTS:   Recent Labs   Lab Test 09/23/24  0705 09/16/24  0613   WBC 6.9 9.1   RBC 2.85* 2.86*   HGB 8.2* 8.0*   HCT 26.1* 26.2*   MCV 92 92   MCH 28.8 28.0   MCHC 31.4* 30.5*   RDW 14.6 14.4    205       Last Basic Metabolic  Panel:  Recent Labs   Lab Test 09/16/24  0613 07/29/24  0527   * 144   POTASSIUM 4.3 4.4   CHLORIDE 117* 112*   MATHIEU 8.1* 7.8*   CO2 23 26   BUN 41.6* 46.0*   CR 2.39* 2.32*   * 123*       Lab Results   Component Value Date    A1C 6.5 09/16/2024       ASSESSMENT/PLAN:  Pneumonia  Was treated with ceftriaxone and doxycycline ultimately transition to Augmentin on 10/9/2024.  Concerned that this may be an aspiration pneumonia therefore will have our speech therapist available him here.  Upon entering the room today it does look like he was short of breath but he was lying flat.  After repositioning, boosting him up to the head of the bed and head of bed elevated, he looked much more comfortable, and was more alert.  -- Continue with Augmentin twice daily to complete the course of treatment  -- SLP to evaluate  -- Continue to encourage the use of oxygen however he does not like this and continues to remove it once it is placed.  Oxygen saturations are 94% on room air today    Urinary tract infection  Has had many infections over the past year.  Urine culture with Enterococcus faecalis.   -- Complete course of Augmentin.    Stage 4 Chronic kidney disease   Progressing. Baseline creatinine around 1.6-1.7 and over the past several months his readings have been a creatinine of 2.3.  During hospitalization this was up to 2.6 in the setting of infection and dehydration.  Of note, Jardiance and lisinopril were stopped while in the TCU.   -- sodium bicarbonate BID  -- follow up with nephrologist as soon as possible.   -- BMP this week.     Depression  Dementia  [facility] 17/30. Irritable throughout the day at times swearing at staff and asking them to leave.  Family is concerned due to ongoing depression and they would like SSRI started.  --Will start citalopram 10 mg daily.  --Continue with seroquel 6.25 mg at 4 pm, 12.5 mg at HS.   -- consider morning dose of seroquel If behaviors are ongoing     Anemia  Has had  anemia since series of hospitalizations earlier this year.  Since moving to the long-term care his hemoglobin has decreased and was 8.0 likely due to progression of underlying chronic kidney disease but also in the setting of acute illness.  Hemoglobin upon discharge was 8.1.  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  Jardiance was discontinued given his GFR fell below 30 while in the TCU.   --gabapentin for neuropathy    Essential hypertension ([facility])  SBPs 140-160s which is appropriate given his history of falls  --Continue to monitor blood pressure and heart rate, adjust medications as needed.  -- amlodipine 10 mg daily and metoprolol 100 mg BID   -- continue with hydralazine 25 mg TID.   -- Continue to monitor blood pressure and heart rate, adjust medications as needed.    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 Surveillance of lower extremity U/S on 6/20 without significant change from November 2023. 50-75% stenosis of distal anastomosis of left LE bypass graft.  INR is scheduled to be drawn tomorrow.  --continue with coumadin/INR    Electronically signed by:  RAY Mello CNP                   Sincerely,        RAY Mello CNP

## 2024-10-17 NOTE — PROGRESS NOTES
[facility] GERIATRICS    Chief Complaint   Patient presents with    RECHECK     HPI:  Cory Berg is a 79 year old  (1945), who is being seen today for an episodic care visit at: [facility] ([facility]) [[id number]]. Today's concern is:     London was visited today while in his room in bed.  INR initially returned back at 7.5 but upon recheck it was 5.5 via fingerstick.  2 days ago the INR was 4.2.  He has had ongoing poor oral intake here at the facility.  Completed Augmentin on 10/15/2024.  Today nursing reports that oxygen saturations on 88% but when staff attempted to put on oxygen, Cory continued to take it off.  Upon entering the room, London is sleeping and breathing heavily.  He opens his eyes to voice but closes them quickly and appears lethargic.  Unable to obtain history due to mental status.       Allergies, and PMH/PSH reviewed in EPIC today.  REVIEW OF SYSTEMS:  Limited secondary to cognitive impairment but today pt reports as noted above    Objective:   BP (!) 168/72   Pulse 53   Temp 97.7  F (36.5  C)   Resp 18   Wt 127.7 kg (281 lb 9.6 oz)   SpO2 92%   BMI 40.41 kg/m    GENERAL APPEARANCE: Lethargic, responds to voice  EYES: no discharge or mattering on lids or lashes noted  ENT:  moist mucous membranes, hearing acuity intact  NECK: supple, symmetrical  RESP: Use of accessory muscles and abdomen, Lung sounds coarse in left lung, patient is on 2 L per nasal cannula  CV:  rate and rhythm regular, no murmur. Edema none noted.   VASCULAR: warm extremities without open areas.  ABDOMEN: normal bowel sounds, soft, nontender.  M/S:   Gait and station: Wheelchair bound, no tenderness or swelling of the joints; able to move all extremities   SKIN:  Inspection and palpation of skin and subcutaneous tissue: skin warm, dry and intact without rashes  NEURO: No facial droop noted.  PSYCH: Altered mental status    CBC RESULTS:   Recent Labs   Lab Test 09/23/24  0705 09/16/24  0613   WBC 6.9  9.1   RBC 2.85* 2.86*   HGB 8.2* 8.0*   HCT 26.1* 26.2*   MCV 92 92   MCH 28.8 28.0   MCHC 31.4* 30.5*   RDW 14.6 14.4    205       Last Basic Metabolic Panel:  Recent Labs   Lab Test 09/16/24  0613 07/29/24  0527   * 144   POTASSIUM 4.3 4.4   CHLORIDE 117* 112*   MATHIEU 8.1* 7.8*   CO2 23 26   BUN 41.6* 46.0*   CR 2.39* 2.32*   * 123*         Lab Results   Component Value Date    A1C 6.5 09/16/2024       Assessment/Plan:  Altered mental status  Mild respiratory distress  Respirations of 22-24 and use of abdominal accessory muscles.  He is lethargic and does not keep his eyes open throughout the visit.  He has been afebrile and oxygen saturations have been 88% on room air.  Staff has been unable to keep oxygen on however today it is in place given lethargy he does not notice it.  INR supratherapeutic on 2 readings today 1 of 5.5 and the other of 7.5 which is in part due to poor oral intake but also likely has an infection present.  Spoke with wife Michelle on the phone this afternoon who has concerns in regard to Cory's presentation when she was here visiting last night.  Discussed sending him to the emergency department for further evaluation in which she agrees  -- Send to [facility] for further eval    MED REC REQUIRED  Post Medication Reconciliation Status: patient was not discharged from an inpatient facility or TCU        Electronically signed by: RAY Mello CNP

## 2024-10-17 NOTE — TELEPHONE ENCOUNTER
[facility] Geriatrics Triage Nurse INR     Provider: RAY Garrison CNP   Facility: [facility]   Facility Type:  LTC    Caller:   Call Back Number: [phone]  Reason for call: INR  Diagnosis/Goal: A. Fib    Date INR New Dose/Orders   10/8  Hospital discharge orders 5 mg daily   10/12 2.6 5 mg daily   10/15 4.1 Held 10/15, 1 mg 10/16   10/17 7.3         Heparin/Lovenox:  No  Currently on ABX?: No  Other interacting medication:  None  Missed or refused doses: No    Patient recently admitted back from the hospital.  Patient has poor appetite, weakness, and is refusing oxygen when his sats go <90%.    Verbal Order/Direction given by Provider:     Provider Giving Order:  RAY Garrison CNP     Verbal Order given to:       Meenakshi Lassiter RN

## 2024-10-17 NOTE — LETTER
10/17/2024      Cory Berg  [address] [facility] [address]        [facility] GERIATRICS    Chief Complaint   Patient presents with     RECHECK     HPI:  Cory Berg is a 79 year old  (1945), who is being seen today for an episodic care visit at: [facility] ([facility]) [[id number]]. Today's concern is:     London was visited today while in his room in bed.  INR initially returned back at 7.5 but upon recheck it was 5.5 via fingerstick.  2 days ago the INR was 4.2.  He has had ongoing poor oral intake here at the facility.  Completed Augmentin on 10/15/2024.  Today nursing reports that oxygen saturations on 88% but when staff attempted to put on oxygen, Cory continued to take it off.  Upon entering the room, London is sleeping and breathing heavily.  He opens his eyes to voice but closes them quickly and appears lethargic.  Unable to obtain history due to mental status.       Allergies, and PMH/PSH reviewed in EPIC today.  REVIEW OF SYSTEMS:  Limited secondary to cognitive impairment but today pt reports as noted above    Objective:   BP (!) 168/72   Pulse 53   Temp 97.7  F (36.5  C)   Resp 18   Wt 127.7 kg (281 lb 9.6 oz)   SpO2 92%   BMI 40.41 kg/m    GENERAL APPEARANCE: Lethargic, responds to voice  EYES: no discharge or mattering on lids or lashes noted  ENT:  moist mucous membranes, hearing acuity intact  NECK: supple, symmetrical  RESP: Use of accessory muscles and abdomen, Lung sounds coarse in left lung, patient is on 2 L per nasal cannula  CV:  rate and rhythm regular, no murmur. Edema none noted.   VASCULAR: warm extremities without open areas.  ABDOMEN: normal bowel sounds, soft, nontender.  M/S:   Gait and station: Wheelchair bound, no tenderness or swelling of the joints; able to move all extremities   SKIN:  Inspection and palpation of skin and subcutaneous tissue: skin warm, dry and intact without rashes  NEURO: No facial droop noted.  PSYCH: Altered mental  status    CBC RESULTS:   Recent Labs   Lab Test 09/23/24  0705 09/16/24  0613   WBC 6.9 9.1   RBC 2.85* 2.86*   HGB 8.2* 8.0*   HCT 26.1* 26.2*   MCV 92 92   MCH 28.8 28.0   MCHC 31.4* 30.5*   RDW 14.6 14.4    205       Last Basic Metabolic Panel:  Recent Labs   Lab Test 09/16/24  0613 07/29/24  0527   * 144   POTASSIUM 4.3 4.4   CHLORIDE 117* 112*   MATHIEU 8.1* 7.8*   CO2 23 26   BUN 41.6* 46.0*   CR 2.39* 2.32*   * 123*         Lab Results   Component Value Date    A1C 6.5 09/16/2024       Assessment/Plan:  Altered mental status  Mild respiratory distress  Respirations of 22-24 and use of abdominal accessory muscles.  He is lethargic and does not keep his eyes open throughout the visit.  He has been afebrile and oxygen saturations have been 88% on room air.  Staff has been unable to keep oxygen on however today it is in place given lethargy he does not notice it.  INR supratherapeutic on 2 readings today 1 of 5.5 and the other of 7.5 which is in part due to poor oral intake but also likely has an infection present.  Spoke with wife Michelle on the phone this afternoon who has concerns in regard to Cory's presentation when she was here visiting last night.  Discussed sending him to the emergency department for further evaluation in which she agrees  -- Send to [facility] for further eval    MED REC REQUIRED  Post Medication Reconciliation Status: patient was not discharged from an inpatient facility or TCU        Electronically signed by: RAY Mello CNP         Sincerely,        RAY Mello CNP

## 2024-10-30 PROBLEM — J96.00 ACUTE RESPIRATORY FAILURE (H): Status: ACTIVE | Noted: 2024-01-01

## 2024-10-30 PROBLEM — N40.0 BENIGN PROSTATIC HYPERPLASIA WITHOUT LOWER URINARY TRACT SYMPTOMS: Status: ACTIVE | Noted: 2024-01-01

## 2024-10-30 PROBLEM — N28.9 DISORDER OF KIDNEY AND URETER, UNSPECIFIED: Status: ACTIVE | Noted: 2020-10-02

## 2024-10-30 PROBLEM — N30.00 ACUTE CYSTITIS WITHOUT HEMATURIA: Status: ACTIVE | Noted: 2020-08-10

## 2024-10-30 PROBLEM — R13.12 DYSPHAGIA, OROPHARYNGEAL PHASE: Status: ACTIVE | Noted: 2024-01-01

## 2024-10-30 PROBLEM — M80.00XA AGE-RELATED OSTEOPOROSIS WITH CURRENT PATHOLOGICAL FRACTURE: Chronic | Status: ACTIVE | Noted: 2024-01-01

## 2024-10-30 PROBLEM — E55.9 VITAMIN D DEFICIENCY: Chronic | Status: ACTIVE | Noted: 2024-01-01

## 2024-10-30 PROBLEM — R94.31 PROLONGED QT INTERVAL: Status: ACTIVE | Noted: 2024-01-01

## 2024-10-30 PROBLEM — S32.512D: Status: ACTIVE | Noted: 2024-01-01

## 2024-10-30 PROBLEM — F22 DELUSIONAL DISORDERS (H): Status: ACTIVE | Noted: 2024-01-01

## 2024-10-30 PROBLEM — N17.9 ACUTE KIDNEY INJURY (H): Status: ACTIVE | Noted: 2024-01-01

## 2024-10-30 PROBLEM — N18.4 TYPE 2 DIABETES MELLITUS WITH STAGE 4 CHRONIC KIDNEY DISEASE, WITHOUT LONG-TERM CURRENT USE OF INSULIN (H): Status: ACTIVE | Noted: 2020-08-15

## 2024-10-30 PROBLEM — B95.62 METHICILLIN RESISTANT STAPHYLOCOCCUS AUREUS INFECTION AS THE CAUSE OF DISEASES CLASSIFIED ELSEWHERE: Status: ACTIVE | Noted: 2024-01-01

## 2024-10-30 PROBLEM — Z96.649 S/P HIP HEMIARTHROPLASTY: Chronic | Status: ACTIVE | Noted: 2024-01-01

## 2024-10-30 PROBLEM — Z99.3 WHEELCHAIR DEPENDENCE: Status: ACTIVE | Noted: 2024-01-01

## 2024-10-30 PROBLEM — N39.0 URINARY TRACT INFECTION, SITE NOT SPECIFIED: Status: ACTIVE | Noted: 2024-01-01

## 2024-10-30 PROBLEM — F03.90 DEMENTIA (H): Status: ACTIVE | Noted: 2022-08-12

## 2024-10-30 PROBLEM — D64.9 ACUTE ON CHRONIC ANEMIA: Status: ACTIVE | Noted: 2024-01-01

## 2024-10-30 PROBLEM — S37.009A KIDNEY INJURY: Status: ACTIVE | Noted: 2024-01-01

## 2024-10-30 PROBLEM — G93.40 ACUTE ENCEPHALOPATHY: Status: ACTIVE | Noted: 2020-09-24

## 2024-10-30 PROBLEM — R26.81 UNSTEADINESS ON FEET: Status: ACTIVE | Noted: 2024-01-01

## 2024-10-30 PROBLEM — F41.9 ANXIETY DISORDER, UNSPECIFIED: Status: ACTIVE | Noted: 2024-01-01

## 2024-10-30 PROBLEM — N18.4 CKD (CHRONIC KIDNEY DISEASE) STAGE 4, GFR 15-29 ML/MIN (H): Status: ACTIVE | Noted: 2022-08-29

## 2024-10-30 PROBLEM — R41.82 ALTERED MENTAL STATUS, UNSPECIFIED: Status: ACTIVE | Noted: 2024-01-01

## 2024-10-30 PROBLEM — M21.372 FOOT DROP, LEFT: Chronic | Status: ACTIVE | Noted: 2024-01-01

## 2024-10-30 PROBLEM — E11.22 TYPE 2 DIABETES MELLITUS WITH STAGE 4 CHRONIC KIDNEY DISEASE, WITHOUT LONG-TERM CURRENT USE OF INSULIN (H): Status: ACTIVE | Noted: 2020-08-15

## 2024-10-30 PROBLEM — R29.6 UNWITNESSED FALL: Status: ACTIVE | Noted: 2024-01-01

## 2024-10-30 PROBLEM — I70.245 ATHEROSCLEROSIS OF NATIVE ARTERIES OF LEFT LEG WITH ULCERATION OF OTHER PART OF FOOT (H): Status: ACTIVE | Noted: 2024-01-01

## 2024-10-30 PROBLEM — I48.20 CHRONIC ATRIAL FIBRILLATION (H): Status: ACTIVE | Noted: 2024-01-01

## 2024-10-30 PROBLEM — R79.89 ELEVATED BRAIN NATRIURETIC PEPTIDE (BNP) LEVEL: Status: ACTIVE | Noted: 2024-01-01

## 2024-10-30 PROBLEM — L89.626 PRESSURE INJURY OF DEEP TISSUE OF LEFT HEEL: Status: ACTIVE | Noted: 2024-01-01

## 2024-10-30 PROBLEM — I89.0 LYMPHEDEMA, NOT ELSEWHERE CLASSIFIED: Status: ACTIVE | Noted: 2024-01-01

## 2024-10-30 PROBLEM — I45.10 RBBB: Status: ACTIVE | Noted: 2024-01-01

## 2024-10-30 PROBLEM — M85.852 OTHER SPECIFIED DISORDERS OF BONE DENSITY AND STRUCTURE, LEFT THIGH: Status: ACTIVE | Noted: 2024-01-01
